# Patient Record
Sex: FEMALE | Race: BLACK OR AFRICAN AMERICAN | NOT HISPANIC OR LATINO | URBAN - METROPOLITAN AREA
[De-identification: names, ages, dates, MRNs, and addresses within clinical notes are randomized per-mention and may not be internally consistent; named-entity substitution may affect disease eponyms.]

---

## 2023-09-22 ENCOUNTER — HOSPITAL ENCOUNTER (INPATIENT)
Dept: DATA CONVERSION | Facility: HOSPITAL | Age: 88
LOS: 5 days | Discharge: HOME | End: 2023-09-27
Attending: INTERNAL MEDICINE | Admitting: INTERNAL MEDICINE
Payer: MEDICARE

## 2023-09-22 DIAGNOSIS — K92.2 GASTROINTESTINAL HEMORRHAGE, UNSPECIFIED: ICD-10-CM

## 2023-09-22 DIAGNOSIS — R10.84 GENERALIZED ABDOMINAL PAIN: ICD-10-CM

## 2023-09-22 LAB
ABO GROUP (TYPE) IN BLOOD: NORMAL
ACTIVATED PARTIAL THROMBOPLASTIN TIME IN PPP BY COAGULATION ASSAY: 40 SEC (ref 27–38)
ALANINE AMINOTRANSFERASE (SGPT) (U/L) IN SER/PLAS: 6 U/L (ref 7–45)
ALBUMIN (G/DL) IN SER/PLAS: 2.6 G/DL (ref 3.4–5)
ALBUMIN (G/DL) IN SER/PLAS: 2.6 G/DL (ref 3.4–5)
ALKALINE PHOSPHATASE (U/L) IN SER/PLAS: 56 U/L (ref 33–136)
ANION GAP IN SER/PLAS: 12 MMOL/L (ref 10–20)
ANTIBODY SCREEN: NORMAL
ASPARTATE AMINOTRANSFERASE (SGOT) (U/L) IN SER/PLAS: 20 U/L (ref 9–39)
BASOPHILS (10*3/UL) IN BLOOD BY AUTOMATED COUNT: 0.04 X10E9/L (ref 0–0.1)
BASOPHILS/100 LEUKOCYTES IN BLOOD BY AUTOMATED COUNT: 0.7 % (ref 0–2)
BILIRUBIN DIRECT (MG/DL) IN SER/PLAS: 0.1 MG/DL (ref 0–0.3)
BILIRUBIN TOTAL (MG/DL) IN SER/PLAS: 0.5 MG/DL (ref 0–1.2)
CALCIUM (MG/DL) IN SER/PLAS: 8.2 MG/DL (ref 8.6–10.3)
CARBON DIOXIDE, TOTAL (MMOL/L) IN SER/PLAS: 24 MMOL/L (ref 21–32)
CHLORIDE (MMOL/L) IN SER/PLAS: 106 MMOL/L (ref 98–107)
CREATININE (MG/DL) IN SER/PLAS: 1.29 MG/DL (ref 0.5–1.05)
EOSINOPHILS (10*3/UL) IN BLOOD BY AUTOMATED COUNT: 0.03 X10E9/L (ref 0–0.4)
EOSINOPHILS/100 LEUKOCYTES IN BLOOD BY AUTOMATED COUNT: 0.5 % (ref 0–6)
ERYTHROCYTE DISTRIBUTION WIDTH (RATIO) BY AUTOMATED COUNT: 17.2 % (ref 11.5–14.5)
ERYTHROCYTE MEAN CORPUSCULAR HEMOGLOBIN CONCENTRATION (G/DL) BY AUTOMATED: 30 G/DL (ref 32–36)
ERYTHROCYTE MEAN CORPUSCULAR VOLUME (FL) BY AUTOMATED COUNT: 92 FL (ref 80–100)
ERYTHROCYTES (10*6/UL) IN BLOOD BY AUTOMATED COUNT: 2.76 X10E12/L (ref 4–5.2)
GFR FEMALE: 40 ML/MIN/1.73M2
GLUCOSE (MG/DL) IN SER/PLAS: 93 MG/DL (ref 74–99)
HEMATOCRIT (%) IN BLOOD BY AUTOMATED COUNT: 25.3 % (ref 36–46)
HEMOGLOBIN (G/DL) IN BLOOD: 7.6 G/DL (ref 12–16)
IMMATURE GRANULOCYTES/100 LEUKOCYTES IN BLOOD BY AUTOMATED COUNT: 0.5 % (ref 0–0.9)
INR IN PPP BY COAGULATION ASSAY: 1.1 (ref 0.9–1.1)
LACTATE (MMOL/L) IN SER/PLAS: 1.3 MMOL/L (ref 0.4–2)
LACTATE (MMOL/L) IN SER/PLAS: 2.6 MMOL/L (ref 0.4–2)
LEUKOCYTES (10*3/UL) IN BLOOD BY AUTOMATED COUNT: 5.9 X10E9/L (ref 4.4–11.3)
LYMPHOCYTES (10*3/UL) IN BLOOD BY AUTOMATED COUNT: 0.74 X10E9/L (ref 0.8–3)
LYMPHOCYTES/100 LEUKOCYTES IN BLOOD BY AUTOMATED COUNT: 12.5 % (ref 13–44)
MONOCYTES (10*3/UL) IN BLOOD BY AUTOMATED COUNT: 0.56 X10E9/L (ref 0.05–0.8)
MONOCYTES/100 LEUKOCYTES IN BLOOD BY AUTOMATED COUNT: 9.4 % (ref 2–10)
NEUTROPHILS (10*3/UL) IN BLOOD BY AUTOMATED COUNT: 4.54 X10E9/L (ref 1.6–5.5)
NEUTROPHILS/100 LEUKOCYTES IN BLOOD BY AUTOMATED COUNT: 76.4 % (ref 40–80)
PHOSPHATE (MG/DL) IN SER/PLAS: 2.1 MG/DL (ref 2.5–4.9)
PLATELETS (10*3/UL) IN BLOOD AUTOMATED COUNT: 277 X10E9/L (ref 150–450)
POTASSIUM (MMOL/L) IN SER/PLAS: 3.6 MMOL/L (ref 3.5–5.3)
PROTEIN TOTAL: 6 G/DL (ref 6.4–8.2)
PROTHROMBIN TIME (PT) IN PPP BY COAGULATION ASSAY: 12.9 SEC (ref 9.8–12.8)
RH FACTOR: NORMAL
SODIUM (MMOL/L) IN SER/PLAS: 138 MMOL/L (ref 136–145)
UREA NITROGEN (MG/DL) IN SER/PLAS: 10 MG/DL (ref 6–23)

## 2023-09-23 LAB
ABO GROUP (TYPE) IN BLOOD: NORMAL
ANION GAP IN SER/PLAS: 14 MMOL/L (ref 10–20)
ANION GAP IN SER/PLAS: NORMAL
CALCIUM (MG/DL) IN SER/PLAS: 7.8 MG/DL (ref 8.6–10.3)
CALCIUM (MG/DL) IN SER/PLAS: NORMAL
CARBON DIOXIDE, TOTAL (MMOL/L) IN SER/PLAS: 21 MMOL/L (ref 21–32)
CARBON DIOXIDE, TOTAL (MMOL/L) IN SER/PLAS: NORMAL
CHLORIDE (MMOL/L) IN SER/PLAS: 108 MMOL/L (ref 98–107)
CHLORIDE (MMOL/L) IN SER/PLAS: NORMAL
CREATININE (MG/DL) IN SER/PLAS: 1.04 MG/DL (ref 0.5–1.05)
CREATININE (MG/DL) IN SER/PLAS: NORMAL
ERYTHROCYTE DISTRIBUTION WIDTH (RATIO) BY AUTOMATED COUNT: 15.9 % (ref 11.5–14.5)
ERYTHROCYTE MEAN CORPUSCULAR HEMOGLOBIN CONCENTRATION (G/DL) BY AUTOMATED: 31.1 G/DL (ref 32–36)
ERYTHROCYTE MEAN CORPUSCULAR VOLUME (FL) BY AUTOMATED COUNT: 91 FL (ref 80–100)
ERYTHROCYTES (10*6/UL) IN BLOOD BY AUTOMATED COUNT: 3.19 X10E12/L (ref 4–5.2)
GFR FEMALE: 52 ML/MIN/1.73M2
GFR FEMALE: NORMAL
GFR MALE: NORMAL
GLUCOSE (MG/DL) IN SER/PLAS: 78 MG/DL (ref 74–99)
GLUCOSE (MG/DL) IN SER/PLAS: NORMAL
HEMATOCRIT (%) IN BLOOD BY AUTOMATED COUNT: 22.3 % (ref 36–46)
HEMATOCRIT (%) IN BLOOD BY AUTOMATED COUNT: 28.9 % (ref 36–46)
HEMATOCRIT (%) IN BLOOD BY AUTOMATED COUNT: 30.3 % (ref 36–46)
HEMATOCRIT (%) IN BLOOD BY AUTOMATED COUNT: 32.2 % (ref 36–46)
HEMATOCRIT (%) IN BLOOD BY AUTOMATED COUNT: NORMAL
HEMOGLOBIN (G/DL) IN BLOOD: 10 G/DL (ref 12–16)
HEMOGLOBIN (G/DL) IN BLOOD: 6.8 G/DL (ref 12–16)
HEMOGLOBIN (G/DL) IN BLOOD: 9 G/DL (ref 12–16)
HEMOGLOBIN (G/DL) IN BLOOD: 9.3 G/DL (ref 12–16)
HEMOGLOBIN (G/DL) IN BLOOD: NORMAL
LEUKOCYTES (10*3/UL) IN BLOOD BY AUTOMATED COUNT: 6.4 X10E9/L (ref 4.4–11.3)
PLATELETS (10*3/UL) IN BLOOD AUTOMATED COUNT: 241 X10E9/L (ref 150–450)
POTASSIUM (MMOL/L) IN SER/PLAS: 3.6 MMOL/L (ref 3.5–5.3)
POTASSIUM (MMOL/L) IN SER/PLAS: NORMAL
RH FACTOR: NORMAL
SODIUM (MMOL/L) IN SER/PLAS: 139 MMOL/L (ref 136–145)
SODIUM (MMOL/L) IN SER/PLAS: NORMAL
UREA NITROGEN (MG/DL) IN SER/PLAS: 10 MG/DL (ref 6–23)
UREA NITROGEN (MG/DL) IN SER/PLAS: NORMAL

## 2023-09-24 LAB
APPEARANCE, URINE: ABNORMAL
BILIRUBIN, URINE: NEGATIVE
BLOOD, URINE: ABNORMAL
COLOR, URINE: ABNORMAL
GLUCOSE, URINE: NEGATIVE MG/DL
HEMATOCRIT (%) IN BLOOD BY AUTOMATED COUNT: 23.8 % (ref 36–46)
HEMATOCRIT (%) IN BLOOD BY AUTOMATED COUNT: 24.2 % (ref 36–46)
HEMOGLOBIN (G/DL) IN BLOOD: 7.5 G/DL (ref 12–16)
HEMOGLOBIN (G/DL) IN BLOOD: 7.6 G/DL (ref 12–16)
KETONES, URINE: NEGATIVE MG/DL
LEUKOCYTE ESTERASE, URINE: ABNORMAL
MUCUS, URINE: ABNORMAL /LPF
NITRITE, URINE: NEGATIVE
PH, URINE: 5 (ref 5–8)
PROTEIN, URINE: NEGATIVE MG/DL
RBC, URINE: 6 /HPF (ref 0–5)
SARS-COV-2 RESULT: DETECTED
SPECIFIC GRAVITY, URINE: 1.01 (ref 1–1.03)
SQUAMOUS EPITHELIAL CELLS, URINE: 1 /HPF
TRANSITIONAL EPITHELIAL CELLS, URINE: <1 /HPF
UROBILINOGEN, URINE: <2 MG/DL (ref 0–1.9)
WBC, URINE: >182 /HPF (ref 0–5)

## 2023-09-25 LAB
ANION GAP IN SER/PLAS: 12 MMOL/L (ref 10–20)
CALCIUM (MG/DL) IN SER/PLAS: 7.3 MG/DL (ref 8.6–10.3)
CARBON DIOXIDE, TOTAL (MMOL/L) IN SER/PLAS: 21 MMOL/L (ref 21–32)
CHLORIDE (MMOL/L) IN SER/PLAS: 109 MMOL/L (ref 98–107)
CREATININE (MG/DL) IN SER/PLAS: 0.98 MG/DL (ref 0.5–1.05)
ERYTHROCYTE DISTRIBUTION WIDTH (RATIO) BY AUTOMATED COUNT: 16.7 % (ref 11.5–14.5)
ERYTHROCYTE MEAN CORPUSCULAR HEMOGLOBIN CONCENTRATION (G/DL) BY AUTOMATED: 30.2 G/DL (ref 32–36)
ERYTHROCYTE MEAN CORPUSCULAR VOLUME (FL) BY AUTOMATED COUNT: 94 FL (ref 80–100)
ERYTHROCYTES (10*6/UL) IN BLOOD BY AUTOMATED COUNT: 2.85 X10E12/L (ref 4–5.2)
GFR FEMALE: 56 ML/MIN/1.73M2
GLUCOSE (MG/DL) IN SER/PLAS: 91 MG/DL (ref 74–99)
HEMATOCRIT (%) IN BLOOD BY AUTOMATED COUNT: 26.8 % (ref 36–46)
HEMOGLOBIN (G/DL) IN BLOOD: 8.1 G/DL (ref 12–16)
LEUKOCYTES (10*3/UL) IN BLOOD BY AUTOMATED COUNT: 5.8 X10E9/L (ref 4.4–11.3)
PLATELETS (10*3/UL) IN BLOOD AUTOMATED COUNT: 262 X10E9/L (ref 150–450)
POTASSIUM (MMOL/L) IN SER/PLAS: 3.4 MMOL/L (ref 3.5–5.3)
SODIUM (MMOL/L) IN SER/PLAS: 139 MMOL/L (ref 136–145)
UREA NITROGEN (MG/DL) IN SER/PLAS: 9 MG/DL (ref 6–23)
URINE CULTURE: NO GROWTH

## 2023-09-26 LAB
ABO GROUP (TYPE) IN BLOOD: NORMAL
ANION GAP IN SER/PLAS: 14 MMOL/L (ref 10–20)
ANTIBODY SCREEN: NORMAL
CALCIUM (MG/DL) IN SER/PLAS: 7.3 MG/DL (ref 8.6–10.3)
CARBON DIOXIDE, TOTAL (MMOL/L) IN SER/PLAS: 19 MMOL/L (ref 21–32)
CHLORIDE (MMOL/L) IN SER/PLAS: 110 MMOL/L (ref 98–107)
CREATININE (MG/DL) IN SER/PLAS: 1.11 MG/DL (ref 0.5–1.05)
ERYTHROCYTE DISTRIBUTION WIDTH (RATIO) BY AUTOMATED COUNT: 16.8 % (ref 11.5–14.5)
ERYTHROCYTE MEAN CORPUSCULAR HEMOGLOBIN CONCENTRATION (G/DL) BY AUTOMATED: 29.8 G/DL (ref 32–36)
ERYTHROCYTE MEAN CORPUSCULAR VOLUME (FL) BY AUTOMATED COUNT: 94 FL (ref 80–100)
ERYTHROCYTES (10*6/UL) IN BLOOD BY AUTOMATED COUNT: 2.53 X10E12/L (ref 4–5.2)
FERRITIN (UG/LL) IN SER/PLAS: 886 UG/L (ref 8–150)
GFR FEMALE: 48 ML/MIN/1.73M2
GLUCOSE (MG/DL) IN SER/PLAS: 90 MG/DL (ref 74–99)
HEMATOCRIT (%) IN BLOOD BY AUTOMATED COUNT: 23.8 % (ref 36–46)
HEMOGLOBIN (G/DL) IN BLOOD: 7.1 G/DL (ref 12–16)
IRON (UG/DL) IN SER/PLAS: 36 UG/DL (ref 35–150)
IRON BINDING CAPACITY (UG/DL) IN SER/PLAS: 121 UG/DL (ref 240–445)
IRON SATURATION (%) IN SER/PLAS: 30 % (ref 25–45)
LEUKOCYTES (10*3/UL) IN BLOOD BY AUTOMATED COUNT: 6 X10E9/L (ref 4.4–11.3)
MAGNESIUM (MG/DL) IN SER/PLAS: 1.9 MG/DL (ref 1.6–2.4)
PLATELETS (10*3/UL) IN BLOOD AUTOMATED COUNT: 241 X10E9/L (ref 150–450)
POTASSIUM (MMOL/L) IN SER/PLAS: 3.4 MMOL/L (ref 3.5–5.3)
RH FACTOR: NORMAL
SODIUM (MMOL/L) IN SER/PLAS: 140 MMOL/L (ref 136–145)
UREA NITROGEN (MG/DL) IN SER/PLAS: 8 MG/DL (ref 6–23)

## 2023-09-27 LAB
ERYTHROCYTE DISTRIBUTION WIDTH (RATIO) BY AUTOMATED COUNT: 17.1 % (ref 11.5–14.5)
ERYTHROCYTE MEAN CORPUSCULAR HEMOGLOBIN CONCENTRATION (G/DL) BY AUTOMATED: 31.9 G/DL (ref 32–36)
ERYTHROCYTE MEAN CORPUSCULAR VOLUME (FL) BY AUTOMATED COUNT: 89 FL (ref 80–100)
ERYTHROCYTES (10*6/UL) IN BLOOD BY AUTOMATED COUNT: 3.51 X10E12/L (ref 4–5.2)
HEMATOCRIT (%) IN BLOOD BY AUTOMATED COUNT: 31.3 % (ref 36–46)
HEMOGLOBIN (G/DL) IN BLOOD: 10 G/DL (ref 12–16)
LEUKOCYTES (10*3/UL) IN BLOOD BY AUTOMATED COUNT: 7.4 X10E9/L (ref 4.4–11.3)
PLATELETS (10*3/UL) IN BLOOD AUTOMATED COUNT: 299 X10E9/L (ref 150–450)

## 2023-09-30 NOTE — H&P
History of Present Illness:   HPI:    MARY TESFAYE is a 87 year old Female with a past medical history of paroxysmal atrial fibrillation not on OAC due to a GI bleed on Xarelto, dementia, stroke  in 2021, anemia, CKD 3, hypertension, hyperlipidemia, cataracts and seizure disorder with focus of for seizures due to right frontal encephalomalacia she is on lifelong Keppra who recently admitted to [facility] with bright red blood per rectum and  now presents to [facility] complaining of bright red blood per rectum.  She endorses that at home today she had numerous episodes of bright red blood per rectum.  Her home health care worker noted that the patient had a large amount of blood  clots in her stool today as well.  Due to the persistence of her GI bleeding she was brought back to the emergency department here at [facility].  She denies any shortness of breath chest pain lightheadedness abdominal pain nausea vomiting or  diarrhea.    Past medical history  Paroxysmal atrial fibrillation not currently on anticoagulation due to GI bleed on Xarelto  Dementia  Stroke in November 2021  Anemia  CKD 3  Hypertension  Hyperlipidemia  Cataracts  Seizure disorder  R frontal encephalomalacia, on Keppra  Recently hospitalized at [facility] with    Past surgical history  Hernia repair  Hysterectomy  Common bile duct stent  Cholecystectomy    Social history  Lives in a senior living facility  No current use of alcohol or tobacco    Comorbidities:   Comorbidites:  ·  Comorbid Conditions atrial fibrillation, chronic kidney disease, hypertension   ·  Type of Atrial Fibrillation Paroxysmal   ·  Chronic Kidney Disease hypertension   ·  CKD Stage Stage 3     Family History:   Family History: reviewed and not pertinent to presenting  problem     Social History:   Social History:  Smoking Status never smoker (1)   Alcohol Use denies   Drug Use denies              Allergies:  ·  No Known Allergies :     Medications  Prior to Admission:      Orders Reconciliation is incomplete.    aspirin 81 mg oral delayed release tablet: 1 tab(s) orally once a day  lisinopril 40 mg oral tablet: 1 tab(s) orally once a day  levETIRAcetam 500 mg oral tablet: 1 tab(s) orally 2 times a day -.Meds to Beds   Anticipated day of discharge 6/28  amLODIPine 10 mg oral tablet: 1 tab(s) orally once a day  furosemide 20 mg oral tablet: 1 tab(s) orally once a day  atorvastatin 40 mg oral tablet: 1 tab(s) orally once a day -.ADOD 7/5 11am - .Meds to [facility]  cephalexin: CARE GIVER STATED THIS WAS AN NEW MEDICATION.    Review of Systems:   Incomplete ROS: patient's impaired mental status     Constitutional: POSITIVE: Malaise; NEGATIVE: Fever,  Chills, Anorexia, Weight Loss     Eyes: NEGATIVE: Blurry Vision, Drainage, Diploplia,  Redness, Vision Loss/ Change     ENMT: NEGATIVE: Nasal Discharge, Nasal Congestion,  Ear Pain, Mouth Pain, Throat Pain     Respiratory: NEGATIVE: Dry Cough, Productive Cough,  Hemoptysis, Wheezing, Shortness of Breath     Cardiac: NEGATIVE: Chest Pain, Dyspnea on Exertion,  Orthopnea, Palpitations, Syncope     Gastrointestinal: NEGATIVE: Nausea, Vomiting, Diarrhea,  Constipation, Abdominal Pain; COMMENTS: Bright red blood per rectum     Genitourinary: NEGATIVE: Discharge, Dysuria, Flank  Pain, Frequency, Hematuria     Musculoskeletal: NEGATIVE: Decreased ROM, Pain, Swelling,  Stiffness, Weakness     Neurological: NEGATIVE: Dizziness, Confusion, Headache,  Seizures, Syncope     Psychiatric: NEGATIVE: Mood Changes, Anxiety, Hallucinations,  Sleep Changes, Suicidal Ideas     Skin: NEGATIVE: Mass, Pain, Pruritus, Rash, Ulcer     Endocrine: NEGATIVE: Heat Intolerance, Cold Intolerance,  Sweat, Polyuria, Thirst     Hematologic/Lymph: POSITIVE: Anemia; NEGATIVE: Bruising,  Easy Bleeding, Night Sweats, Petechiae     Allergic/Immunologic: NEGATIVE: Anaphylaxis, Itchy/  Teary Eyes, Itching, Sneezing, Swelling     Breast: NEGATIVE: Pain,  Mass, Discharge, Nipple Itching,  Gynecomastia     All Other Systems: All other systems reviewed and  are negative     Objective:     Objective Information:      T   P  R  BP   MAP  SpO2   Value  36.6  59  18  160/73   76  98%  Date/Time 9/22 21:29 9/22 21:29 9/22 21:29 9/22 21:29  9/22 20:00 9/22 21:29  Range  (36.6C - 36.7C )  (59 - 85 )  (12 - 19 )  (102 - 160 )/ (38 - 75 )  (59 - 76 )  (95% - 100% )      Pain reported at 9/22 21:19: 0 = None    Physical Exam by System:    Constitutional: Awake and alert. Non toxic.  Pleasantly  confused   Eyes: PERRL, EOMI, clear sclera   ENMT: mucous membranes moist, no apparent injury,  no lesions seen   Head/Neck: Neck supple, no apparent injury, thyroid  without mass or tenderness, No JVD, trachea midline, no bruits   Respiratory/Thorax: Lungs are Clear to auscultation   Cardiovascular: Regular, rate and rhythm,  2+ equal  pulses of the extremities, normal S 1and S 2   Gastrointestinal: Nondistended, soft, non-tender,  no rebound tenderness or guarding, no masses palpable, no organomegaly, +BS, no bruits   Genitourinary: No Discharge, vesicles or other abnormalities   Musculoskeletal: Moves all 4 extremities.   Extremities: No edema. No calf tenderness.   Neurological: Awake and alert. No focal motor deficits.   Pleasantly confused   Breast: No masses, tenderness, no discharge or discoloration   Lymphatic: No significant lymphadenopathy   Psychological: No evidence of tension   Skin: Warm and dry     Medications:    Medications:      CARDIOVASCULAR AGENTS:    1. Lisinopril:  40  mg  Oral  Daily    2. amLODIPine (NORVASC):  10  mg  Oral  Daily    3. Furosemide:  20  mg  Oral  Daily      CENTRAL NERVOUS SYSTEM AGENTS:    1. Acetaminophen:  650  mg  Oral  Every 4 Hours   PRN       2. Aspirin Enteric Coated:  81  mg  Oral  Daily    3. levETIRAcetam (KEPPRA):  500  mg  Oral  2 Times a Day    4. Ondansetron Injectable:  4  mg  IntraVenous Push  Every 4 Hours   PRN          GASTROINTESTINAL AGENTS:    1. Pantoprazole Injectable:  40  mg  IntraVenous Push  Every 12 Hours      METABOLIC AGENTS:    1. Atorvastatin:  40  mg  Oral  Daily      NUTRITIONAL PRODUCTS:    1. Sodium Chloride 0.9% Infusion:  1000  mL  IntraVenous  <Continuous>      Recent Lab Results:    Results:    CBC: 9/22/2023 15:13              \     Hgb     /                              \     7.6 L    /  WBC  ----------------  Plt               5.9       ----------------    277              /     Hct     \                              /     25.3 L    \            RBC: 2.76 L    MCV: 92     Neutrophil %: 76.4      RFP: 9/22/2023 15:13  NA+        Cl-     BUN  /                         138    106    10  /  --------------------------------  Glucose                ---------------------------  93    K+     HCO3-   Creat \                         3.6    24    1.29 H \  Calcium : 8.2 LAnion Gap : 12          Albumin : 2.6 L     Phos : 2.1 L      Coagulation: 9/22/2023 15:13  PT  /                    12.9 H /  -------<    INR          ----------<      1.1  PTT\                    40 H \                         I have reviewed these laboratory results:    Lactate, Level  Trending View      Result 22-Sep-2023 18:44:00  22-Sep-2023 15:13:00    Lactate, Level 1.3   2.6   H        Hepatic Function Panel  22-Sep-2023 15:13:00      Result Value    Aspartate Transaminase, Serum  20    ALB  2.6   L   T Bili  0.5    Bilirubin, Serum Direct - Conjugated  0.1    ALKP  56    Alanine Aminotransferase, Serum  6   L   T Pro  6.0   L     Complete Blood Count + Differential  22-Sep-2023 15:13:00      Result Value    White Blood Cell Count  5.9    Red Blood Cell Count  2.76   L   HGB  7.6   L   HCT  25.3   L   MCV  92    MCHC  30.0   L   PLT  277    RDW-CV  17.2   H   Neutrophil %  76.4    Immature Granulocytes %  0.5    Lymphocyte %  12.5    Monocyte %  9.4    Eosinophil %  0.5    Basophil %  0.7    Neutrophil Count  4.54    Lymphocyte  Count  0.74   L   Monocyte Count  0.56    Eosinophil Count  0.03    Basophil Count  0.04      Renal Function Panel  22-Sep-2023 15:13:00      Result Value    Glucose, Serum  93    NA  138    K  3.6    CL  106    Bicarbonate, Serum  24    Anion Gap, Serum  12    BUN  10    CREAT  1.29   H   GFR Female  40   A   Calcium, Serum  8.2   L   Phosphorus, Serum  2.1   L   ALB  2.6   L     Coagulation Screen  22-Sep-2023 15:13:00      Result Value    Prothrombin Time, Plasma  12.9   H   International Normalized Ratio, Plasma  1.1    Activated Partial Thromboplastin Time  40   H       Assessment and Plan:     Impression 1: Lower GI bleed   Plan for Impression 1: Serial hemoglobin hematocrit.   Transfuse to keep hemoglobin greater than 7.0  Protonix 40 mg IV every 12 hours  IV hydration  GI consultation   Impression 2: Paroxysmal atrial fibrillation   Plan for Impression 2: Telemetry   not currently on AOC due to GI bleeding w Xarelto   Impression 3: Hypertension   Plan for Impression 3: Lisinopril 40 mg orally daily  Amlodipine 10 mg orally daily  Furosemide 20 mg orally daily   Impression 4: Seizure disorder focus is R encephalomalacia   Plan for Impression 4: Keppra 500 mg every 12 hours   Impression 5: Hyperlipidemia   Plan for Impression 5: Atorvastatin 40 mg orally  daily   Impression 6: CKD 3   Plan for Impression 6: Trend creatinine   Impression 7: History of a stroke 2021   Plan for Impression 7: On aspirin 81 mg orally daily  which is on hold due to GI bleeding  Atorvastatin 40 mg orally daily   Impression 8: DVT prophylaxis   Plan for Impression 8: No heparin or Lovenox due  to GI bleeding  SCDs       Electronic Signatures:  Jerry Simmons ()  (Signed 22-Sep-2023 22:19)   Authored: History of Present Illness, Comorbidities,  Family History, Social History, Allergies, Medications Prior to Admission, Review of Systems, Objective, Assessment and Plan, Note Completion      Last Updated: 22-Sep-2023 22:19 by Iris  "Jerry (DO)    References:  1.  Data Referenced From \"Patient Profile - Adult v2\" 22-Sep-2023 21:26   "

## 2023-09-30 NOTE — PROGRESS NOTES
Service: Medicine     Subjective Data:   MARY TESFAYE is a 87 year old Female who is Hospital Day # 5.     She had some more blood in stool today.    Objective Data:     Objective Information:      T   P  R  BP   MAP  SpO2   Value  36.6  61  17  152/86      100%  Date/Time 9/26 16:03 9/26 16:03 9/26 16:03 9/26 16:03    9/26 16:03  Range  (36.1C - 36.8C )  (51 - 75 )  (16 - 18 )  (107 - 164 )/ (49 - 95 )    (95% - 100% )      Pain reported at 9/26 4:00: 0 = None    Physical Exam by System:    Constitutional: no acute distress   Respiratory/Thorax: CTAB   Cardiovascular: regular rhythm, no murmurs   Gastrointestinal: normoactive BS, nontender, non-distended   Extremities: no edema in legs   Neurological: alert and oriented, moving all extremities   Psychological: normal affect   Skin: no rashes or lesions     Recent Lab Results:    Results:    CBC: 9/26/2023 07:11              \     Hgb     /                              \     7.1 L    /  WBC  ----------------  Plt               6.0       ----------------    241              /     Hct     \                              /     23.8 L    \            RBC: 2.53 L    MCV: 94           BMP: 9/26/2023 07:11  NA+        Cl-     BUN  /                         140    110 H   8  /  --------------------------------  Glucose                ---------------------------  90    K+     HCO3-   Creat \                         3.4 L 19 L    1.11 H \  Calcium : 7.3 L    Anion Gap : 14      Assessment and Plan:   Code Status:  ·  Code Status Full Code     Assessment:    87 yoF with blood from rectum.    9/26: Will get second PRBC unit today. Had some BRBPR today.      Diverticular bleeding  Acute on chronic blood loss anemia  COVID infection  Hematuria(?)  Paroxysmal afib  Dementia  Seizure disorder  HTN  - transfusion today  - if no further bleeding and hgb is ok tomorrow, can dc      Electronic Signatures:  Paulino Farris)  (Signed 26-Sep-2023 17:24)   Authored:  Service, Subjective Data, Objective Data, Assessment  and Plan, Note Completion      Last Updated: 26-Sep-2023 17:24 by Paulino Farris)

## 2023-09-30 NOTE — DISCHARGE SUMMARY
Send Summary:   Discharge Summary Providers:  Provider Role Provider Name   · Attending Paulino Farris   · Consulting Vj Jorge   · Primary Required, No Pcp       Note Recipients: Required, No Pcp, MD       Discharge:    Summary:   Admission Date: .22-Sep-2023 12:50:00   Discharge Date: 27-Sep-2023   Attending Physician at Discharge: Paulino Farris   Admission Reason: anemia   Final Discharge Diagnoses: gi bleed   Procedures: none   Condition at Discharge: Satisfactory   Disposition at Discharge: .Home   Vital Signs:        T   P  R  BP   MAP  SpO2   Value  36.6  66  17  145/45      98%  Date/Time 9/27 7:59 9/27 7:59 9/27 7:59 9/27 7:59    9/27 7:59  Range  (36.4C - 37C )  (51 - 67 )  (16 - 18 )  (119 - 152 )/ (45 - 86 )    (98% - 100% )  Highest temp of 37 C was recorded at 9/27 0:44    Date:            Weight/Scale Type:  Height:   22-Sep-2023 21:26  65  kg / standing  159.9  cm  Physical Exam:    No distress  Alert and oriented  CTAB  RRR  Soft abdomen  No leg edema  Hospital Course:    87 yoF with blood from rectum.    Seen by GI; suspected to be diverticular bleed. She was given 1 u PRBC. No plan for scopes here. Bleeding seems to be slowing/stopping. Incidental COVID positive.     9/26: Will get second PRBC unit today. Had some BRBPR today.    9/27: No further bleeding seen. Hgb is good today. Discharge home.     Diverticular bleeding  Acute on chronic blood loss anemia  COVID infection  Hematuria, UTI  Paroxysmal afib  Dementia  Seizure disorder  HTN  CKD      I spent more than 30 min coordinating this patient's discharge.       Discharge Information:    and Continuing Care:   Lab Results - Pending:    None  Radiology Results - Pending: None   Discharge Instructions:    na  Discharge Medications: Home Medication   aspirin 81 mg oral delayed release tablet - 1 tab(s) orally once a day  lisinopril 40 mg oral tablet - 1 tab(s) orally once a day  levETIRAcetam 500 mg oral tablet - 1 tab(s) orally 2  times a day -.Meds to Beds   Anticipated day of discharge 6/28  amLODIPine 10 mg oral tablet - 1 tab(s) orally once a day  furosemide 20 mg oral tablet - 1 tab(s) orally once a day  atorvastatin 40 mg oral tablet - 1 tab(s) orally once a day -.ADOD 7/5 11am - .Meds to Beds [facility]  ferrous sulfate 325 mg (65 mg elemental iron) oral delayed release tablet - 1 tab(s) orally once a day     take with stool softener      PRN Medication     DNR Status:   ·  Code Status Code Status order at time of discharge: Full Code       Electronic Signatures:  Paulino Farris)  (Signed 27-Sep-2023 10:31)   Authored: Send Summary, Summary Content, Ongoing Care,  DNR Status, Note Completion      Last Updated: 27-Sep-2023 10:31 by Paulino Farris)

## 2023-09-30 NOTE — PROGRESS NOTES
Service: Medicine     Subjective Data:   MARY TESFAYE is a 87 year old Female who is Hospital Day # 2.     Repeat hemoglobin is 9, no further bloody stools, but she does have hematuria therefore consulted urology, aspirin currently on hold.  Currently n.p.o. awaiting GI input on IV fluids.    Objective Data:     Objective Information:      T   P  R  BP   MAP  SpO2   Value  36  70  17  150/69   76  100%  Date/Time 9/23 8:31 9/23 8:31 9/23 8:31 9/23 8:31  9/22 20:00 9/23 8:31  Range  (36C - 36.7C )  (59 - 85 )  (12 - 19 )  (102 - 160 )/ (38 - 75 )  (59 - 76 )  (95% - 100% )      Pain reported at 9/22 21:19: 0 = None    Physical Exam by System:    Constitutional: Well developed, awake/alert/oriented  x3, no distress, alert and cooperative   ENMT: mucous membranes moist, no apparent injury,  no lesions seen   Head/Neck: Neck supple, no apparent injury, thyroid  without mass or tenderness, No JVD, trachea midline, no bruits   Respiratory/Thorax: Patent airways, CTAB, normal  breath sounds with good chest expansion, thorax symmetric   Cardiovascular: Regular, rate and rhythm, no murmurs,  2+ equal pulses of the extremities, normal S 1and S 2   Gastrointestinal: Nondistended, soft, non-tender,  no rebound tenderness or guarding, no masses palpable, no organomegaly, +BS, no bruits   Extremities: normal extremities, no cyanosis edema,  contusions or wounds, no clubbing   Neurological: pleasantly confused     Medication:    Medications:          Continuous Medications       --------------------------------    1. Sodium Chloride 0.9% Infusion:  1000  mL  IntraVenous  <Continuous>         Scheduled Medications       --------------------------------    1. amLODIPine (NORVASC):  10  mg  Oral  Daily    2. Atorvastatin:  40  mg  Oral  Daily    3. Furosemide:  20  mg  Oral  Daily    4. levETIRAcetam (KEPPRA):  500  mg  Oral  2 Times a Day    5. Lisinopril:  40  mg  Oral  Daily    6. Pantoprazole Injectable:  40  mg   IntraVenous Push  Every 12 Hours         PRN Medications       --------------------------------    1. Acetaminophen:  650  mg  Oral  Every 4 Hours    2. Ondansetron Injectable:  4  mg  IntraVenous Push  Every 4 Hours           Currently Suspended Medications       --------------------------------    1. Aspirin Enteric Coated:  81  mg  Oral  Daily      Recent Lab Results:    Results:    CBC: 9/23/2023 09:59              \     Hgb     /                              \     9.0 L    /  WBC  ----------------  Plt               6.4       ----------------    241              /     Hct     \                              /     28.9 L    \            RBC: 3.19 L    MCV: 91     Neutrophil %: 76.4      RFP: 9/22/2023 15:13  NA+        Cl-     BUN  /                         138    106    10  /  --------------------------------  Glucose                ---------------------------  93    K+     HCO3-   Creat \                         3.6    24    1.29 H \  Calcium : 8.2 LAnion Gap : 12          Albumin : 2.6 L     Phos : 2.1 L      Coagulation: 9/22/2023 15:13  PT  /                    12.9 H /  -------<    INR          ----------<      1.1  PTT\                    40 H \                       Radiology Results:    Results:        Impression:    1. No evidence of active extravasation.  2. Cholelithiasis without evidence of cholecystitis.  3. Nonspecific subcutaneous lesion at the umbilicus which may be post  surgical/post procedural in nature versus pathological. Recommend  correlation with patient history.  4. Additional chronic findings as noted above.      CTA Chest Abdomen + Pelvis [Sep 22 2023  7:49PM]      Assessment and Plan:   Comorbidities:  ·  Comorbidity anemia   ·  Anemia acute blood loss anemia     Code Status:  ·  Code Status Full Code     Advance Care Planning:  Advance Care Planning: I evaluated the patient  and determined the patient's capacity to understand the risks, benefits and alternatives to treatment.  I elicited the patient's goals for treatment and reviewed advance directives and medical orders for life sustaining treatment. The patient was given  an opportunity to review a blank advance directive as appropriate.     Assessment:    1.  hematachezia  -no Further episodes this morning, aspirin on hold, awaiting GI consultation continue on IV fluids and n.p.o. status    2.  Acute blood loss anemia  -Hemoglobin was 6.8 that is post 1 unit of blood currently hemoglobin is 9    3.  Paroxysmal A-fib  -Not on anticoagulation due to history of GI bleed only aspirin which is also on hold    4.  hematuria  -consult urology    5. DEmentia  -at baseline      Electronic Signatures:  Stephanie Trotter)  (Signed 23-Sep-2023 11:37)   Authored: Service, Subjective Data, Objective Data, Assessment  and Plan, Note Completion      Last Updated: 23-Sep-2023 11:37 by Stephanie Trotter)

## 2023-09-30 NOTE — PROGRESS NOTES
Service: Gastroenterology     Subjective Data:   MARY TESFAYE is a 87 year old Female who is Hospital Day # 3.     Denies complaints, no bowel movements overnight .  Noted hematuria for which urology consulted.    Objective Data:     Objective Information:      T   P  R  BP   MAP  SpO2   Value  36.6  73  16  146/53      96%  Date/Time 9/24 8:06 9/24 8:06 9/24 8:06 9/24 8:06    9/24 8:06  Range  (36C - 37C )  (58 - 78 )  (16 - 18 )  (124 - 169 )/ (53 - 81 )    (96% - 100% )  Highest temp of 37 C was recorded at 9/23 23:51      Pain reported at 9/24 8:06: 0 = None    Physical Exam by System:    Constitutional: alert, nad   Gastrointestinal: soft,nt/nd     Medication:    Medications:          Continuous Medications       --------------------------------    1. Sodium Chloride 0.9% Infusion:  1000  mL  IntraVenous  <Continuous>         Scheduled Medications       --------------------------------    1. amLODIPine (NORVASC):  10  mg  Oral  Daily    2. Atorvastatin:  40  mg  Oral  Daily    3. Furosemide:  20  mg  Oral  Daily    4. levETIRAcetam (KEPPRA):  500  mg  Oral  2 Times a Day    5. Lisinopril:  40  mg  Oral  Daily    6. Pantoprazole Injectable:  40  mg  IntraVenous Push  Every 12 Hours         PRN Medications       --------------------------------    1. Acetaminophen:  650  mg  Oral  Every 4 Hours    2. Ondansetron Injectable:  4  mg  IntraVenous Push  Every 4 Hours           Currently Suspended Medications       --------------------------------    1. Aspirin Enteric Coated:  81  mg  Oral  Daily      Recent Lab Results:    Results:    CBC: 9/23/2023 12:30              \     Hgb     /                              \     Canceled       /  WBC  ----------------  Plt               6.4       ----------------    241              /     Hct     \                              /     Canceled       \            RBC: 3.19 L    MCV: 91           BMP: 9/23/2023 13:04  NA+        Cl-     BUN  /                          139    108 H   10  /  --------------------------------  Glucose                ---------------------------  78    K+     HCO3-   Creat \                         3.6  21    1.04  \  Calcium : 7.8 L    Anion Gap : 14      Radiology Results:    Results:        Impression:    1. No evidence of active extravasation.  2. Cholelithiasis without evidence of cholecystitis.  3. Nonspecific subcutaneous lesion at the umbilicus which may be post  surgical/post procedural in nature versus pathological. Recommend  correlation with patient history.  4. Additional chronic findings as noted above.      CTA Chest Abdomen + Pelvis [Sep 22 2023  7:49PM]      Impression:  CTA Chest Abdomen + Pelvis [Sep 22 2023  6:43PM]      Impression:  CTA Chest Abdomen + Pelvis [Sep 22 2023  3:29PM]      Assessment and Plan:   Code Status:  ·  Code Status Full Code     Assessment:    88yo with AF not on AC due to GIB, dementia, h/o CVA 2021, CKD, HTN presenting with hematochezia.  Pt was just recently d/c'd from [facility] 9/21  for rectal bleeding  that was found to be diverticular s/p colonoscopy with injection of epi and clip placement on 9/19 . EGD negative .  No further rectal bleeding since admission.   Noted hematuria , Hgb down to 7.5 today .     Diverticular bleed, no bowel movements, suspecting old blood has been passed.  If develops recurrent clinically significant GIB will need IR intervention given recent endoscopic therapy     Monitor for recurrent rectal bleeding  supportive care per primary  urology has been consulted for hematuria  ok to advance to full liquid diet and further as tolerated    GI Will sign off, Pls reconsult if evidence of rectal bleeding .        Consult Status:  Consult Status    (select all that apply): initial  consult complete, will not follow, call as needed       Electronic Signatures:  Gloria Belle)  (Signed 24-Sep-2023 11:51)   Authored: Service, Subjective Data, Objective Data, Assessment  and Plan,  Note Completion      Last Updated: 24-Sep-2023 11:51 by Gloria Belle)

## 2023-09-30 NOTE — PROGRESS NOTES
Subjective Data:   MARY TESFAYE is a 87 year old Female who is Hospital Day # 3.    Overnight Events: Patient had an uneventful night.     Objective Data:     Objective Information:      T   P  R  BP   MAP  SpO2   Value  36.6  73  16  146/53      96%  Date/Time 9/24 8:06 9/24 8:06 9/24 8:06 9/24 8:06    9/24 8:06  Range  (36C - 37C )  (58 - 78 )  (16 - 18 )  (124 - 169 )/ (53 - 81 )    (96% - 100% )  Highest temp of 37 C was recorded at 9/23 23:51      Pain reported at 9/24 8:06: 0 = None    Physical Exam by System:    Constitutional: Well developed, awake/alert/oriented  x3, no distress, alert and cooperative   ENMT: mucous membranes moist, no apparent injury,  no lesions seen   Head/Neck: Neck supple, no apparent injury, thyroid  without mass or tenderness, No JVD, trachea midline, no bruits   Respiratory/Thorax: Patent airways, CTAB, normal  breath sounds with good chest expansion, thorax symmetric   Cardiovascular: Regular, rate and rhythm, no murmurs,  2+ equal pulses of the extremities, normal S 1and S 2   Gastrointestinal: Nondistended, soft, non-tender,  no rebound tenderness or guarding, no masses palpable, no organomegaly, +BS, no bruits   Extremities: normal extremities, no cyanosis edema,  contusions or wounds, no clubbing   Neurological: pleasantly confused     Recent Lab Results:    Results:    CBC: 9/23/2023 12:30              \     Hgb     /                              \     Canceled       /  WBC  ----------------  Plt                                     /     Hct     \                              /     Canceled       \                            BMP: 9/23/2023 13:04  NA+        Cl-     BUN  /                         139    108 H   10  /  --------------------------------  Glucose                ---------------------------  78    K+     HCO3-   Creat \                         3.6  21    1.04  \  Calcium : 7.8 L    Anion Gap : 14      Radiology Results:    Results:         Impression:    1. No evidence of active extravasation.  2. Cholelithiasis without evidence of cholecystitis.  3. Nonspecific subcutaneous lesion at the umbilicus which may be post  surgical/post procedural in nature versus pathological. Recommend  correlation with patient history.  4. Additional chronic findings as noted above.      CTA Chest Abdomen + Pelvis [Sep 22 2023  7:49PM]      Assessment and Plan:   Code Status:  ·  Code Status Full Code     Assessment:    Likely Diverticular Bleed:   History of GIB:   -Hemoglobin remains stable  - GI following, appreciate assistance. Recommend monitoring for recurrent rectal bleeding with supportive care.   - Diet advanced  - Will continue to monitor hemoglobin    Acute blood loss anemia:   Hematuria:   Anemia possibly secondary to a combination of diverticular bleed which has resolved, and possible hematuria.   - Hemoglobin on arrival 10, now down to 7.5 s/p 1 unit of PRBC  - Urology consulted, recommend straight cath for a UA with culture to rule out/in UTI. If no UTI, then recommend possible cystoscopy.  - Will continue to trend hemoglobin, transfuse for less than 7.   - Antibiotic consideration pending UA and culture results    Paroxysmal A-fib:   Per patient history  -Not on anticoagulation due to history of GI bleed only aspirin which is also on hold    Dementia:   Seizure Disorder:   Essential Hypertension:   Per patient history  - Continue outpatient follow-up.   - Continue home medications as able  - Holding ASA due to concern for active bleeding    DVT ppx: SCDs and ambulation as able  Dispostion: Unknown  LOS: >48 hours       Electronic Signatures:  Mynor Rowland)  (Signed 24-Sep-2023 14:05)   Authored: Subjective Data, Objective Data, Assessment  and Plan      Last Updated: 24-Sep-2023 14:05 by Mynor Rowland)

## 2024-03-06 NOTE — CONSULTS
Service:   Service: Gastroenterology     Consult:  Consult requested by (Attending Name): Jerry Simmons   Reason: Lower GI Bleed     History of Present Illness:   HPI:    88yo with AF not on AC due to GIB, dementia, h/o CVA 2021, CKD, HTN presenting with hematochezia.  Pt with dementia, limited historian, primarily from chart.  Pt  was just recently d/c'd from [facility] for rectal bleeding that was found to be diverticular s/p colonoscopy with injection of epi and clip placement . She was discharged on 9/21 , reportedly developed hematochezia with clots prompting presentation. Pt currently  denies any complaints, no abd pain, nv, bloating or other complaints. No bowel movements since admission, noted to have hematuria. CT angio on admission shwed no active bleeing, cholelithiasis.  [facility]- hiatal hernia, mild duodenitis, colonoscopy  9/19 with active bleeding diverticulum in sigmoid colon s/p epi and clips placement. On discharge Hgb 8.6.  On admission here Hgb 7.6 , downtrend to 6.8, s/p 1 unit overnight with Hgb now 10. INR wnl, Cr 1.29.      PMH  as above    Fhx: noncontributory    Soc hx: no tob/etoh/illicits    Review Family/Social History and ROS:   Social History:    Smoking Status: never smoker  (1)   Alcohol Use: denies (1)   Drug Use: denies  (1)     All Other Systems: All other systems reviewed and  are negative            Allergies:  ·  No Known Allergies :     Objective:     Objective Information:        T   P  R  BP   MAP  SpO2   Value  36.6  62  17  140/77   76  97%  Date/Time 9/23 14:46 9/23 14:46 9/23 14:46 9/23 14:46  9/22 20:00 9/23 14:46  Range  (36C - 36.7C )  (58 - 85 )  (12 - 19 )  (102 - 160 )/ (38 - 81 )  (59 - 76 )  (95% - 100% )         Weights   9/22 21:26: Weight in kg (Weight (kg))  65  9/22 21:26: Weight in lbs ((lbs))  143.3  9/22 21:26: BMI (kg/m2) (BMI (kg/m2))  25.422    Physical Exam by System:    Constitutional: alert, nad   Respiratory/Thorax: ctab    Cardiovascular: rrr   Gastrointestinal: soft,nt/nd     Medications:    Medications:          Continuous Medications       --------------------------------    1. Sodium Chloride 0.9% Infusion:  1000  mL  IntraVenous  <Continuous>         Scheduled Medications       --------------------------------    1. amLODIPine (NORVASC):  10  mg  Oral  Daily    2. Atorvastatin:  40  mg  Oral  Daily    3. Furosemide:  20  mg  Oral  Daily    4. levETIRAcetam (KEPPRA):  500  mg  Oral  2 Times a Day    5. Lisinopril:  40  mg  Oral  Daily    6. Pantoprazole Injectable:  40  mg  IntraVenous Push  Every 12 Hours         PRN Medications       --------------------------------    1. Acetaminophen:  650  mg  Oral  Every 4 Hours    2. Ondansetron Injectable:  4  mg  IntraVenous Push  Every 4 Hours           Currently Suspended Medications       --------------------------------    1. Aspirin Enteric Coated:  81  mg  Oral  Daily      Recent Lab Results:    Results:        I have reviewed these laboratory results:    Hemoglobin + Hematocrit  Trending View      Result 23-Sep-2023 13:04:00  23-Sep-2023 00:21:00    HCT 32.2   L   22.3   L    HGB 10.0   L   6.8   L        Basic Metabolic Panel  23-Sep-2023 13:04:00      Result Value    Glucose, Serum  78    NA  139    K  3.6    CL  108   H   Bicarbonate, Serum  21    Anion Gap, Serum  14    BUN  10    CREAT  1.04    GFR Female  52   A   Calcium, Serum  7.8   L     Complete Blood Count  23-Sep-2023 09:59:00      Result Value    White Blood Cell Count  6.4    Red Blood Cell Count  3.19   L   HGB  9.0   L   HCT  28.9   L   MCV  91    MCHC  31.1   L   PLT  241    RDW-CV  15.9   H     Lactate, Level  22-Sep-2023 18:44:00      Result Value    Lactate, Level  1.3      Hepatic Function Panel  22-Sep-2023 15:13:00      Result Value    Aspartate Transaminase, Serum  20    ALB  2.6   L   T Bili  0.5    Bilirubin, Serum Direct - Conjugated  0.1    ALKP  56    Alanine Aminotransferase, Serum  6   L   T Pro   "6.0   L       Radiology Results:    Results:        Impression:    1. No evidence of active extravasation.  2. Cholelithiasis without evidence of cholecystitis.  3. Nonspecific subcutaneous lesion at the umbilicus which may be post  surgical/post procedural in nature versus pathological. Recommend  correlation with patient history.  4. Additional chronic findings as noted above.      CTA Chest Abdomen + Pelvis [Sep 22 2023  7:49PM]        Assessment:    88yo with AF not on AC due to GIB, dementia, h/o CVA 2021, CKD, HTN presenting with hematochezia.  Pt was just recently d/c'd from [facility] 9/21  for rectal bleeding  that was found to be diverticular s/p colonoscopy with injection of epi and clip placement . EGD negative .  No furthr bleeding since admission; over increment in hgb following 1 unit , currently at 10.  Noted hematuria.      Diverticular bleed, possibly passing old blood and equilibrating.  IF develops recurrent clinically significant GIB will need IR intervention given recent endoscopic therapy     Monitor for recurrent rectal bleeding  supportive care per primary  urology has been consulted for hematuria  clear liquid diet, may advance to FLD if no bleeding later today    Consult Status:  Consult Status    (select all that apply): initial  consult complete, will follow   Consult Order ID: [id number]       Electronic Signatures:  Gloria Belle)  (Signed 23-Sep-2023 15:21)   Authored: Service, History of Present Illness, Review  Family/Social History and ROS, Allergies, Objective, Assessment/Recommendations, Note Completion      Last Updated: 23-Sep-2023 15:21 by Gloria Belle)    References:  1.  Data Referenced From \"History and Physical\" 22-Sep-2023 21:50   "

## 2024-03-06 NOTE — CONSULTS
Service:   Service: Urology     Consult:  Consult requested by (Attending Name): Stephanie Trotter   Reason: hematuria     History of Present Illness:   HPI:    MARY TESFAYE is a 87 year old Female who was recently at [facility] for a GI bleed.  She is now admitted to [facility] with a complaint of blood per rectum.  She has been  found to have blood in her urine now.  She denies having blood inher urine at home.  She has been incontinent here abut at home she has frequency but usually voids into the toilet.  She had a CT angio of the abdomen and pelvis showing no  abnormality.  She does have a.fib and is not on an anti-coagulant          Past medical history  Paroxysmal atrial fibrillation not currently on anticoagulation due to GI bleed on Xarelto  Dementia  Stroke in November 2021  Anemia  CKD 3  Hypertension  Hyperlipidemia  Cataracts  Seizure disorder  R frontal encephalomalacia, on Keppra  Recently hospitalized at [facility] with    Past surgical history  Hernia repair  Hysterectomy  Common bile duct stent  Cholecystectomy        Review Family/Social History and ROS:     Review Family/Social History and ROS:        Family History: reviewed and not pertinent to presenting problem  Alcohol Use: denies  Drug Use: denies    Patient ROS Incomplete due to patient's impaired mental status  History and Physical from Sep 22, 2023    Constitutional  Patient is  POSITIVE for Malaise and NEGATIVE for Fever, Chills, Anorexia, Weight Loss,     Eyes  Patient is  NEGATIVE for Blurry Vision, Drainage, Diploplia, Redness, Vision Loss/ Change    ENMT  Patient is  NEGATIVE for Nasal Discharge, Nasal Congestion, Ear Pain, Mouth Pain, Throat Pain    Respiratory  Patient is  NEGATIVE for Dry Cough, Productive Cough, Hemoptysis, Wheezing, Shortness of Breath    Cardiac  Patient is  NEGATIVE for Chest Pain, Dyspnea on Exertion, Orthopnea, Syncope    Gastrointestinal  Patient is  NEGATIVE for Nausea, Vomiting, Diarrhea, Constipation,  Abdominal Pain,  COMMENTS: Bright red blood per rectum    Genitourinary  Patient is  NEGATIVE for Discharge, Dysuria, Flank Pain, Frequency, Hematuria    Musculoskeletal  Patient is  NEGATIVE for Decreased ROM, Pain, Swelling, Stiffness, Weakness    Neurological  Patient is  NEGATIVE for Dizziness, Confusion, Headache, Seizures, Syncope    Psychiatric  Patient is  NEGATIVE for Mood Changes, Anxiety, Hallucinations, Sleep Changes, Suicidal Ideas    Skin  Patient is  NEGATIVE for Mass, Pain, Pruritus, Rash, Ulcer    Endocrine  Patient is  NEGATIVE for Heat Intolerance, Cold Intolerance, Sweat, Polyuria, Thirst    Hematologic/Lymph  Patient is  POSITIVE for Anemia,  and NEGATIVE for Bruising, Easy Bleeding, Night Sweats, Petechiae    Allergic/Immunologic  Patient is  NEGATIVE for Anaphylaxis, Itchy/ Teary Eyes, Itching, Sneezing, Swelling    Breast  Patient is  NEGATIVE for Pain, Mass, Discharge, Nipple Itching, Gynecomastia    All other systems reviewed and are negative    Social History:    Smoking Status: never smoker  (1)   Alcohol Use: denies (1)   Drug Use: denies  (1)            Allergies:  ·  No Known Allergies :     Objective:     Objective Information:        T   P  R  BP   MAP  SpO2   Value  36.6  73  16  146/53      96%  Date/Time 9/24 8:06 9/24 8:06 9/24 8:06 9/24 8:06    9/24 8:06  Range  (36C - 37C )  (58 - 78 )  (16 - 18 )  (124 - 169 )/ (53 - 81 )    (96% - 100% )  Highest temp of 37 C was recorded at 9/23 23:51        Pain reported at 9/24 8:06: 0 = None    Physical Exam by System:    Constitutional: Well developed, awake/alert/oriented  x3, no distress, alert and cooperative   Eyes: EOMI   Head/Neck: Supple   Respiratory/Thorax: Normal respiratory pattern   Gastrointestinal: No abdominal masses nor tenderness   Extremities: Moves all 4   Psychological: Appropriate mood and behavior   Skin: Dry     Medications:    Medications:          Continuous Medications        --------------------------------    1. Sodium Chloride 0.9% Infusion:  1000  mL  IntraVenous  <Continuous>         Scheduled Medications       --------------------------------    1. amLODIPine (NORVASC):  10  mg  Oral  Daily    2. Atorvastatin:  40  mg  Oral  Daily    3. Furosemide:  20  mg  Oral  Daily    4. levETIRAcetam (KEPPRA):  500  mg  Oral  2 Times a Day    5. Lisinopril:  40  mg  Oral  Daily    6. Pantoprazole Injectable:  40  mg  IntraVenous Push  Every 12 Hours         PRN Medications       --------------------------------    1. Acetaminophen:  650  mg  Oral  Every 4 Hours    2. Ondansetron Injectable:  4  mg  IntraVenous Push  Every 4 Hours           Currently Suspended Medications       --------------------------------    1. Aspirin Enteric Coated:  81  mg  Oral  Daily      Recent Lab Results:    Results:        I have reviewed these laboratory results:    Basic Metabolic Panel  23-Sep-2023 13:04:00      Result Value    Glucose, Serum  78    NA  139    K  3.6    CL  108   H   Bicarbonate, Serum  21    Anion Gap, Serum  14    BUN  10    CREAT  1.04    GFR Female  52   A   Calcium, Serum  7.8   L     Complete Blood Count  23-Sep-2023 09:59:00      Result Value    White Blood Cell Count  6.4    Red Blood Cell Count  3.19   L   HGB  9.0   L   HCT  28.9   L   MCV  91    MCHC  31.1   L   PLT  241    RDW-CV  15.9   H     Lactate, Level  22-Sep-2023 18:44:00      Result Value    Lactate, Level  1.3      Hepatic Function Panel  22-Sep-2023 15:13:00      Result Value    Aspartate Transaminase, Serum  20    ALB  2.6   L   T Bili  0.5    Bilirubin, Serum Direct - Conjugated  0.1    ALKP  56    Alanine Aminotransferase, Serum  6   L   T Pro  6.0   L     Complete Blood Count + Differential  22-Sep-2023 15:13:00      Result Value    White Blood Cell Count  5.9    Red Blood Cell Count  2.76   L   HGB  7.6   L   HCT  25.3   L   MCV  92    MCHC  30.0   L   PLT  277    RDW-CV  17.2   H   Neutrophil %  76.4    Immature  Granulocytes %  0.5    Lymphocyte %  12.5    Monocyte %  9.4    Eosinophil %  0.5    Basophil %  0.7    Neutrophil Count  4.54    Lymphocyte Count  0.74   L   Monocyte Count  0.56    Eosinophil Count  0.03    Basophil Count  0.04        Radiology Results:    Results:    I have reviewed this radiology result:         Impression:  CTA Chest Abdomen + Pelvis [Sep 22 2023  3:29PM]        Impression:    1. No evidence of active extravasation.  2. Cholelithiasis without evidence of cholecystitis.  3. Nonspecific subcutaneous lesion at the umbilicus which may be post  surgical/post procedural in nature versus pathological. Recommend  correlation with patient history.  4. Additional chronic findings as noted above.      CTA Chest Abdomen + Pelvis [Sep 22 2023  7:49PM]      Impression:  CTA Chest Abdomen + Pelvis [Sep 22 2023  6:43PM]      Impression:  CTA Chest Abdomen + Pelvis [Sep 22 2023  3:29PM]        Plan of Care Reviewed With:  Plan of Care Reviewed With: patient     Consult Status:  Consult Status    (select all that apply): initial  consult complete, will follow   Consult Order ID: [id number]     Problem List:       Additional Dx:   Diverticulosis of colon with hemorrhage:    Diverticulosis of large intestine:    Anemia due to acute blood loss:    Paroxysmal atrial fibrillation:    Body mass index (BMI) of 38.0 to 38.9 in adult:    Primary hypertension:    Stage 3 chronic kidney disease:    Hypertensive heart disease without heart failure:     Problem/Assessment/Plan:    Impression 1: Hematuria   Plan for Impression 1: She has blood in her urine  by the suction device.  Will have her get a straight cath for a UA and urine culture.  Will see if there is a UTI.  She has normal upper tracts.  If no UTI and significant hematuria she will need a cysto.       Electronic Signatures:  Vj Jorge)  (Signed 24-Sep-2023 08:28)   Authored: Service, History of Present Illness, Review  Family/Social History and ROS,  "Allergies, Objective, Assessment/Recommendations, Note Completion      Last Updated: 24-Sep-2023 08:28 by Vj Jroge)    References:  1.  Data Referenced From \"Consult-Gastroenterology\" 23-Sep-2023 15:07   "

## 2024-07-19 ENCOUNTER — LAB (OUTPATIENT)
Dept: LAB | Facility: LAB | Age: 89
End: 2024-07-19
Payer: MEDICARE

## 2024-07-19 DIAGNOSIS — N18.9 CHRONIC KIDNEY DISEASE, UNSPECIFIED: ICD-10-CM

## 2024-07-19 DIAGNOSIS — Z79.899 OTHER LONG TERM (CURRENT) DRUG THERAPY: ICD-10-CM

## 2024-07-19 DIAGNOSIS — E66.01 MORBID (SEVERE) OBESITY DUE TO EXCESS CALORIES (MULTI): ICD-10-CM

## 2024-07-19 DIAGNOSIS — E03.9 HYPOTHYROIDISM, UNSPECIFIED: ICD-10-CM

## 2024-07-19 DIAGNOSIS — Z13.21 ENCOUNTER FOR SCREENING FOR NUTRITIONAL DISORDER: ICD-10-CM

## 2024-07-19 DIAGNOSIS — D50.9 IRON DEFICIENCY ANEMIA, UNSPECIFIED: ICD-10-CM

## 2024-07-19 LAB
BASOPHILS # BLD AUTO: 0.02 X10*3/UL (ref 0–0.1)
BASOPHILS NFR BLD AUTO: 0.6 %
EOSINOPHIL # BLD AUTO: 0.02 X10*3/UL (ref 0–0.4)
EOSINOPHIL NFR BLD AUTO: 0.6 %
ERYTHROCYTE [DISTWIDTH] IN BLOOD BY AUTOMATED COUNT: 17 % (ref 11.5–14.5)
HCT VFR BLD AUTO: 38.9 % (ref 36–46)
HGB BLD-MCNC: 12.4 G/DL (ref 12–16)
IMM GRANULOCYTES # BLD AUTO: 0.01 X10*3/UL (ref 0–0.5)
IMM GRANULOCYTES NFR BLD AUTO: 0.3 % (ref 0–0.9)
LYMPHOCYTES # BLD AUTO: 1.11 X10*3/UL (ref 0.8–3)
LYMPHOCYTES NFR BLD AUTO: 31.8 %
MCH RBC QN AUTO: 27 PG (ref 26–34)
MCHC RBC AUTO-ENTMCNC: 31.9 G/DL (ref 32–36)
MCV RBC AUTO: 85 FL (ref 80–100)
MONOCYTES # BLD AUTO: 0.25 X10*3/UL (ref 0.05–0.8)
MONOCYTES NFR BLD AUTO: 7.2 %
NEUTROPHILS # BLD AUTO: 2.08 X10*3/UL (ref 1.6–5.5)
NEUTROPHILS NFR BLD AUTO: 59.5 %
NRBC BLD-RTO: 0 /100 WBCS (ref 0–0)
PLATELET # BLD AUTO: 285 X10*3/UL (ref 150–450)
PTH-INTACT SERPL-MCNC: 70.3 PG/ML (ref 18.5–88)
RBC # BLD AUTO: 4.59 X10*6/UL (ref 4–5.2)
T4 FREE SERPL-MCNC: 0.96 NG/DL (ref 0.78–1.48)
TSH SERPL-ACNC: 3.94 MIU/L (ref 0.44–3.98)
WBC # BLD AUTO: 3.5 X10*3/UL (ref 4.4–11.3)

## 2024-07-19 PROCEDURE — 36415 COLL VENOUS BLD VENIPUNCTURE: CPT

## 2024-07-19 PROCEDURE — 80053 COMPREHEN METABOLIC PANEL: CPT

## 2024-07-19 PROCEDURE — 84439 ASSAY OF FREE THYROXINE: CPT

## 2024-07-19 PROCEDURE — 83970 ASSAY OF PARATHORMONE: CPT

## 2024-07-19 PROCEDURE — 80061 LIPID PANEL: CPT

## 2024-07-19 PROCEDURE — 85025 COMPLETE CBC W/AUTO DIFF WBC: CPT

## 2024-07-19 PROCEDURE — 84443 ASSAY THYROID STIM HORMONE: CPT

## 2024-07-19 PROCEDURE — 82728 ASSAY OF FERRITIN: CPT

## 2024-07-20 LAB
ALBUMIN SERPL BCP-MCNC: 3.2 G/DL (ref 3.4–5)
ALP SERPL-CCNC: 75 U/L (ref 33–136)
ALT SERPL W P-5'-P-CCNC: 9 U/L (ref 7–45)
ANION GAP SERPL CALC-SCNC: 15 MMOL/L (ref 10–20)
AST SERPL W P-5'-P-CCNC: 23 U/L (ref 9–39)
BILIRUB SERPL-MCNC: 0.8 MG/DL (ref 0–1.2)
BUN SERPL-MCNC: 18 MG/DL (ref 6–23)
CALCIUM SERPL-MCNC: 9.3 MG/DL (ref 8.6–10.6)
CHLORIDE SERPL-SCNC: 99 MMOL/L (ref 98–107)
CHOLEST SERPL-MCNC: 152 MG/DL (ref 0–199)
CHOLESTEROL/HDL RATIO: 3.4
CO2 SERPL-SCNC: 27 MMOL/L (ref 21–32)
CREAT SERPL-MCNC: 0.9 MG/DL (ref 0.5–1.05)
EGFRCR SERPLBLD CKD-EPI 2021: 62 ML/MIN/1.73M*2
FERRITIN SERPL-MCNC: 704 NG/ML (ref 8–150)
GLUCOSE SERPL-MCNC: 99 MG/DL (ref 74–99)
HDLC SERPL-MCNC: 44.2 MG/DL
LDLC SERPL CALC-MCNC: 96 MG/DL
NON HDL CHOLESTEROL: 108 MG/DL (ref 0–149)
POTASSIUM SERPL-SCNC: 4.2 MMOL/L (ref 3.5–5.3)
PROT SERPL-MCNC: 7.5 G/DL (ref 6.4–8.2)
SODIUM SERPL-SCNC: 137 MMOL/L (ref 136–145)
TRIGL SERPL-MCNC: 61 MG/DL (ref 0–149)
VLDL: 12 MG/DL (ref 0–40)

## 2024-11-17 ENCOUNTER — APPOINTMENT (OUTPATIENT)
Dept: RADIOLOGY | Facility: HOSPITAL | Age: 89
DRG: 446 | End: 2024-11-17
Payer: MEDICARE

## 2024-11-17 ENCOUNTER — APPOINTMENT (OUTPATIENT)
Dept: CARDIOLOGY | Facility: HOSPITAL | Age: 89
DRG: 446 | End: 2024-11-17
Payer: MEDICARE

## 2024-11-17 ENCOUNTER — HOSPITAL ENCOUNTER (INPATIENT)
Facility: HOSPITAL | Age: 89
DRG: 446 | End: 2024-11-17
Attending: EMERGENCY MEDICINE | Admitting: INTERNAL MEDICINE
Payer: MEDICARE

## 2024-11-17 VITALS
TEMPERATURE: 98 F | HEART RATE: 60 BPM | WEIGHT: 143.3 LBS | HEIGHT: 58 IN | DIASTOLIC BLOOD PRESSURE: 59 MMHG | SYSTOLIC BLOOD PRESSURE: 117 MMHG | RESPIRATION RATE: 16 BRPM | OXYGEN SATURATION: 93 % | BODY MASS INDEX: 30.08 KG/M2

## 2024-11-17 DIAGNOSIS — K81.0 ACUTE CHOLECYSTITIS: Primary | ICD-10-CM

## 2024-11-17 LAB
ALBUMIN SERPL BCP-MCNC: 3.3 G/DL (ref 3.4–5)
ALP SERPL-CCNC: 228 U/L (ref 33–136)
ALT SERPL W P-5'-P-CCNC: 42 U/L (ref 7–45)
ANION GAP SERPL CALC-SCNC: 16 MMOL/L (ref 10–20)
APTT PPP: 51 SECONDS (ref 27–38)
AST SERPL W P-5'-P-CCNC: 146 U/L (ref 9–39)
BASOPHILS # BLD AUTO: 0.02 X10*3/UL (ref 0–0.1)
BASOPHILS NFR BLD AUTO: 0.3 %
BILIRUB SERPL-MCNC: 1.8 MG/DL (ref 0–1.2)
BNP SERPL-MCNC: 419 PG/ML (ref 0–99)
BUN SERPL-MCNC: 11 MG/DL (ref 6–23)
CALCIUM SERPL-MCNC: 9.1 MG/DL (ref 8.6–10.3)
CARDIAC TROPONIN I PNL SERPL HS: 40 NG/L (ref 0–13)
CARDIAC TROPONIN I PNL SERPL HS: 48 NG/L (ref 0–13)
CHLORIDE SERPL-SCNC: 97 MMOL/L (ref 98–107)
CO2 SERPL-SCNC: 26 MMOL/L (ref 21–32)
CREAT SERPL-MCNC: 1.04 MG/DL (ref 0.5–1.05)
EGFRCR SERPLBLD CKD-EPI 2021: 51 ML/MIN/1.73M*2
EOSINOPHIL # BLD AUTO: 0.04 X10*3/UL (ref 0–0.4)
EOSINOPHIL NFR BLD AUTO: 0.5 %
ERYTHROCYTE [DISTWIDTH] IN BLOOD BY AUTOMATED COUNT: 15.7 % (ref 11.5–14.5)
GLUCOSE SERPL-MCNC: 122 MG/DL (ref 74–99)
HCT VFR BLD AUTO: 39.9 % (ref 36–46)
HGB BLD-MCNC: 13 G/DL (ref 12–16)
IMM GRANULOCYTES # BLD AUTO: 0.03 X10*3/UL (ref 0–0.5)
IMM GRANULOCYTES NFR BLD AUTO: 0.4 % (ref 0–0.9)
INR PPP: 1.1 (ref 0.9–1.1)
LIPASE SERPL-CCNC: 42 U/L (ref 9–82)
LYMPHOCYTES # BLD AUTO: 0.64 X10*3/UL (ref 0.8–3)
LYMPHOCYTES NFR BLD AUTO: 8.1 %
MAGNESIUM SERPL-MCNC: 1.88 MG/DL (ref 1.6–2.4)
MCH RBC QN AUTO: 27.8 PG (ref 26–34)
MCHC RBC AUTO-ENTMCNC: 32.6 G/DL (ref 32–36)
MCV RBC AUTO: 85 FL (ref 80–100)
MONOCYTES # BLD AUTO: 0.42 X10*3/UL (ref 0.05–0.8)
MONOCYTES NFR BLD AUTO: 5.3 %
NEUTROPHILS # BLD AUTO: 6.71 X10*3/UL (ref 1.6–5.5)
NEUTROPHILS NFR BLD AUTO: 85.4 %
NRBC BLD-RTO: 0 /100 WBCS (ref 0–0)
PLATELET # BLD AUTO: 294 X10*3/UL (ref 150–450)
POTASSIUM SERPL-SCNC: 3.8 MMOL/L (ref 3.5–5.3)
PROT SERPL-MCNC: 7.9 G/DL (ref 6.4–8.2)
PROTHROMBIN TIME: 12.7 SECONDS (ref 9.8–12.8)
RBC # BLD AUTO: 4.68 X10*6/UL (ref 4–5.2)
SARS-COV-2 RNA RESP QL NAA+PROBE: NOT DETECTED
SODIUM SERPL-SCNC: 135 MMOL/L (ref 136–145)
WBC # BLD AUTO: 7.9 X10*3/UL (ref 4.4–11.3)

## 2024-11-17 PROCEDURE — 93005 ELECTROCARDIOGRAM TRACING: CPT

## 2024-11-17 PROCEDURE — 71045 X-RAY EXAM CHEST 1 VIEW: CPT | Performed by: STUDENT IN AN ORGANIZED HEALTH CARE EDUCATION/TRAINING PROGRAM

## 2024-11-17 PROCEDURE — 83880 ASSAY OF NATRIURETIC PEPTIDE: CPT | Performed by: EMERGENCY MEDICINE

## 2024-11-17 PROCEDURE — 51701 INSERT BLADDER CATHETER: CPT

## 2024-11-17 PROCEDURE — 76705 ECHO EXAM OF ABDOMEN: CPT

## 2024-11-17 PROCEDURE — 2550000001 HC RX 255 CONTRASTS: Performed by: EMERGENCY MEDICINE

## 2024-11-17 PROCEDURE — 87635 SARS-COV-2 COVID-19 AMP PRB: CPT | Performed by: EMERGENCY MEDICINE

## 2024-11-17 PROCEDURE — 76705 ECHO EXAM OF ABDOMEN: CPT | Performed by: STUDENT IN AN ORGANIZED HEALTH CARE EDUCATION/TRAINING PROGRAM

## 2024-11-17 PROCEDURE — 71045 X-RAY EXAM CHEST 1 VIEW: CPT

## 2024-11-17 PROCEDURE — 80053 COMPREHEN METABOLIC PANEL: CPT | Performed by: EMERGENCY MEDICINE

## 2024-11-17 PROCEDURE — 96365 THER/PROPH/DIAG IV INF INIT: CPT | Mod: 59

## 2024-11-17 PROCEDURE — 99222 1ST HOSP IP/OBS MODERATE 55: CPT | Performed by: INTERNAL MEDICINE

## 2024-11-17 PROCEDURE — 84484 ASSAY OF TROPONIN QUANT: CPT | Performed by: EMERGENCY MEDICINE

## 2024-11-17 PROCEDURE — 85025 COMPLETE CBC W/AUTO DIFF WBC: CPT | Performed by: EMERGENCY MEDICINE

## 2024-11-17 PROCEDURE — 83735 ASSAY OF MAGNESIUM: CPT | Performed by: EMERGENCY MEDICINE

## 2024-11-17 PROCEDURE — 74177 CT ABD & PELVIS W/CONTRAST: CPT | Performed by: RADIOLOGY

## 2024-11-17 PROCEDURE — 2500000004 HC RX 250 GENERAL PHARMACY W/ HCPCS (ALT 636 FOR OP/ED): Performed by: EMERGENCY MEDICINE

## 2024-11-17 PROCEDURE — 2500000004 HC RX 250 GENERAL PHARMACY W/ HCPCS (ALT 636 FOR OP/ED): Performed by: INTERNAL MEDICINE

## 2024-11-17 PROCEDURE — 85730 THROMBOPLASTIN TIME PARTIAL: CPT | Performed by: EMERGENCY MEDICINE

## 2024-11-17 PROCEDURE — 96375 TX/PRO/DX INJ NEW DRUG ADDON: CPT | Mod: 59

## 2024-11-17 PROCEDURE — 36415 COLL VENOUS BLD VENIPUNCTURE: CPT | Performed by: EMERGENCY MEDICINE

## 2024-11-17 PROCEDURE — 99285 EMERGENCY DEPT VISIT HI MDM: CPT | Mod: 25

## 2024-11-17 PROCEDURE — 74177 CT ABD & PELVIS W/CONTRAST: CPT

## 2024-11-17 PROCEDURE — 85610 PROTHROMBIN TIME: CPT | Performed by: EMERGENCY MEDICINE

## 2024-11-17 PROCEDURE — 2500000001 HC RX 250 WO HCPCS SELF ADMINISTERED DRUGS (ALT 637 FOR MEDICARE OP): Performed by: INTERNAL MEDICINE

## 2024-11-17 PROCEDURE — 99222 1ST HOSP IP/OBS MODERATE 55: CPT | Performed by: STUDENT IN AN ORGANIZED HEALTH CARE EDUCATION/TRAINING PROGRAM

## 2024-11-17 PROCEDURE — 83690 ASSAY OF LIPASE: CPT | Performed by: EMERGENCY MEDICINE

## 2024-11-17 PROCEDURE — 1100000001 HC PRIVATE ROOM DAILY

## 2024-11-17 RX ORDER — NAPROXEN SODIUM 220 MG/1
81 TABLET, FILM COATED ORAL DAILY
Status: DISCONTINUED | OUTPATIENT
Start: 2024-11-17 | End: 2024-11-19 | Stop reason: HOSPADM

## 2024-11-17 RX ORDER — LISINOPRIL 40 MG/1
40 TABLET ORAL DAILY
COMMUNITY

## 2024-11-17 RX ORDER — SODIUM CHLORIDE 9 MG/ML
100 INJECTION, SOLUTION INTRAVENOUS CONTINUOUS
Status: DISCONTINUED | OUTPATIENT
Start: 2024-11-17 | End: 2024-11-17

## 2024-11-17 RX ORDER — LEVETIRACETAM 5 MG/ML
500 INJECTION INTRAVASCULAR EVERY 12 HOURS
Status: DISCONTINUED | OUTPATIENT
Start: 2024-11-17 | End: 2024-11-19 | Stop reason: HOSPADM

## 2024-11-17 RX ORDER — ONDANSETRON HYDROCHLORIDE 2 MG/ML
4 INJECTION, SOLUTION INTRAVENOUS ONCE
Status: COMPLETED | OUTPATIENT
Start: 2024-11-17 | End: 2024-11-17

## 2024-11-17 RX ORDER — LISINOPRIL 20 MG/1
40 TABLET ORAL DAILY
Status: DISCONTINUED | OUTPATIENT
Start: 2024-11-17 | End: 2024-11-19 | Stop reason: HOSPADM

## 2024-11-17 RX ORDER — ATORVASTATIN CALCIUM 40 MG/1
40 TABLET, FILM COATED ORAL NIGHTLY
COMMUNITY

## 2024-11-17 RX ORDER — PANTOPRAZOLE SODIUM 40 MG/1
40 TABLET, DELAYED RELEASE ORAL
Status: DISCONTINUED | OUTPATIENT
Start: 2024-11-17 | End: 2024-11-19 | Stop reason: HOSPADM

## 2024-11-17 RX ORDER — ACETAMINOPHEN 325 MG/1
650 TABLET ORAL EVERY 4 HOURS PRN
Status: DISCONTINUED | OUTPATIENT
Start: 2024-11-17 | End: 2024-11-19 | Stop reason: HOSPADM

## 2024-11-17 RX ORDER — AMLODIPINE BESYLATE 10 MG/1
10 TABLET ORAL DAILY
COMMUNITY

## 2024-11-17 RX ORDER — ONDANSETRON HYDROCHLORIDE 2 MG/ML
4 INJECTION, SOLUTION INTRAVENOUS EVERY 8 HOURS PRN
Status: DISCONTINUED | OUTPATIENT
Start: 2024-11-17 | End: 2024-11-19 | Stop reason: HOSPADM

## 2024-11-17 RX ORDER — NAPROXEN SODIUM 220 MG/1
81 TABLET, FILM COATED ORAL DAILY
COMMUNITY

## 2024-11-17 RX ORDER — HEPARIN SODIUM 5000 [USP'U]/ML
5000 INJECTION, SOLUTION INTRAVENOUS; SUBCUTANEOUS EVERY 8 HOURS
Status: DISCONTINUED | OUTPATIENT
Start: 2024-11-17 | End: 2024-11-19 | Stop reason: HOSPADM

## 2024-11-17 RX ORDER — ATORVASTATIN CALCIUM 40 MG/1
40 TABLET, FILM COATED ORAL NIGHTLY
Status: DISCONTINUED | OUTPATIENT
Start: 2024-11-17 | End: 2024-11-19 | Stop reason: HOSPADM

## 2024-11-17 RX ORDER — SODIUM CHLORIDE 9 MG/ML
75 INJECTION, SOLUTION INTRAVENOUS CONTINUOUS
Status: ACTIVE | OUTPATIENT
Start: 2024-11-17 | End: 2024-11-18

## 2024-11-17 RX ORDER — ACETAMINOPHEN 160 MG/5ML
650 SOLUTION ORAL EVERY 4 HOURS PRN
Status: DISCONTINUED | OUTPATIENT
Start: 2024-11-17 | End: 2024-11-19 | Stop reason: HOSPADM

## 2024-11-17 RX ORDER — FUROSEMIDE 20 MG/1
10 TABLET ORAL DAILY
COMMUNITY

## 2024-11-17 RX ORDER — ACETAMINOPHEN 650 MG/1
650 SUPPOSITORY RECTAL EVERY 4 HOURS PRN
Status: DISCONTINUED | OUTPATIENT
Start: 2024-11-17 | End: 2024-11-19 | Stop reason: HOSPADM

## 2024-11-17 RX ORDER — HYDROMORPHONE HYDROCHLORIDE 0.2 MG/ML
0.2 INJECTION INTRAMUSCULAR; INTRAVENOUS; SUBCUTANEOUS EVERY 4 HOURS PRN
Status: DISCONTINUED | OUTPATIENT
Start: 2024-11-17 | End: 2024-11-19 | Stop reason: HOSPADM

## 2024-11-17 RX ORDER — LEVOTHYROXINE SODIUM 25 UG/1
25 TABLET ORAL DAILY
COMMUNITY

## 2024-11-17 RX ORDER — AMLODIPINE BESYLATE 10 MG/1
10 TABLET ORAL DAILY
Status: DISCONTINUED | OUTPATIENT
Start: 2024-11-17 | End: 2024-11-19 | Stop reason: HOSPADM

## 2024-11-17 RX ORDER — PANTOPRAZOLE SODIUM 40 MG/10ML
40 INJECTION, POWDER, LYOPHILIZED, FOR SOLUTION INTRAVENOUS
Status: DISCONTINUED | OUTPATIENT
Start: 2024-11-17 | End: 2024-11-19 | Stop reason: HOSPADM

## 2024-11-17 RX ORDER — LISINOPRIL 10 MG/1
10 TABLET ORAL DAILY
Status: DISCONTINUED | OUTPATIENT
Start: 2024-11-17 | End: 2024-11-17

## 2024-11-17 RX ORDER — LEVETIRACETAM 500 MG/1
500 TABLET ORAL 2 TIMES DAILY
COMMUNITY

## 2024-11-17 RX ORDER — MORPHINE SULFATE 4 MG/ML
4 INJECTION, SOLUTION INTRAMUSCULAR; INTRAVENOUS ONCE
Status: COMPLETED | OUTPATIENT
Start: 2024-11-17 | End: 2024-11-17

## 2024-11-17 RX ORDER — ONDANSETRON 4 MG/1
4 TABLET, FILM COATED ORAL EVERY 8 HOURS PRN
Status: DISCONTINUED | OUTPATIENT
Start: 2024-11-17 | End: 2024-11-19 | Stop reason: HOSPADM

## 2024-11-17 RX ADMIN — PIPERACILLIN SODIUM AND TAZOBACTAM SODIUM 3.38 G: 3; .375 INJECTION, SOLUTION INTRAVENOUS at 12:03

## 2024-11-17 RX ADMIN — HEPARIN SODIUM 5000 UNITS: 5000 INJECTION, SOLUTION INTRAVENOUS; SUBCUTANEOUS at 05:20

## 2024-11-17 RX ADMIN — LISINOPRIL 40 MG: 20 TABLET ORAL at 10:01

## 2024-11-17 RX ADMIN — SODIUM CHLORIDE 100 ML/HR: 9 INJECTION, SOLUTION INTRAVENOUS at 05:20

## 2024-11-17 RX ADMIN — PANTOPRAZOLE SODIUM 40 MG: 40 INJECTION, POWDER, FOR SOLUTION INTRAVENOUS at 06:27

## 2024-11-17 RX ADMIN — SODIUM CHLORIDE 75 ML/HR: 9 INJECTION, SOLUTION INTRAVENOUS at 05:43

## 2024-11-17 RX ADMIN — HEPARIN SODIUM 5000 UNITS: 5000 INJECTION, SOLUTION INTRAVENOUS; SUBCUTANEOUS at 21:38

## 2024-11-17 RX ADMIN — PIPERACILLIN SODIUM AND TAZOBACTAM SODIUM 3.38 G: 3; .375 INJECTION, SOLUTION INTRAVENOUS at 04:43

## 2024-11-17 RX ADMIN — PIPERACILLIN SODIUM AND TAZOBACTAM SODIUM 3.38 G: 3; .375 INJECTION, SOLUTION INTRAVENOUS at 17:35

## 2024-11-17 RX ADMIN — ONDANSETRON 4 MG: 2 INJECTION, SOLUTION INTRAMUSCULAR; INTRAVENOUS at 05:09

## 2024-11-17 RX ADMIN — LEVETIRACETAM 500 MG: 5 INJECTION INTRAVENOUS at 21:38

## 2024-11-17 RX ADMIN — IOHEXOL 75 ML: 350 INJECTION, SOLUTION INTRAVENOUS at 04:11

## 2024-11-17 RX ADMIN — PIPERACILLIN SODIUM AND TAZOBACTAM SODIUM 3.38 G: 3; .375 INJECTION, SOLUTION INTRAVENOUS at 22:57

## 2024-11-17 RX ADMIN — MORPHINE SULFATE 4 MG: 4 INJECTION, SOLUTION INTRAMUSCULAR; INTRAVENOUS at 05:09

## 2024-11-17 SDOH — SOCIAL STABILITY: SOCIAL INSECURITY: HAVE YOU HAD ANY THOUGHTS OF HARMING ANYONE ELSE?: NO

## 2024-11-17 SDOH — SOCIAL STABILITY: SOCIAL INSECURITY
WITHIN THE LAST YEAR, HAVE YOU BEEN RAPED OR FORCED TO HAVE ANY KIND OF SEXUAL ACTIVITY BY YOUR PARTNER OR EX-PARTNER?: NO

## 2024-11-17 SDOH — SOCIAL STABILITY: SOCIAL INSECURITY: ABUSE: ADULT

## 2024-11-17 SDOH — SOCIAL STABILITY: SOCIAL INSECURITY: DO YOU FEEL UNSAFE GOING BACK TO THE PLACE WHERE YOU ARE LIVING?: NO

## 2024-11-17 SDOH — HEALTH STABILITY: MENTAL HEALTH: HOW MANY DRINKS CONTAINING ALCOHOL DO YOU HAVE ON A TYPICAL DAY WHEN YOU ARE DRINKING?: PATIENT DOES NOT DRINK

## 2024-11-17 SDOH — HEALTH STABILITY: MENTAL HEALTH: HOW OFTEN DO YOU HAVE SIX OR MORE DRINKS ON ONE OCCASION?: NEVER

## 2024-11-17 SDOH — SOCIAL STABILITY: SOCIAL INSECURITY: WITHIN THE LAST YEAR, HAVE YOU BEEN AFRAID OF YOUR PARTNER OR EX-PARTNER?: NO

## 2024-11-17 SDOH — SOCIAL STABILITY: SOCIAL INSECURITY
WITHIN THE LAST YEAR, HAVE YOU BEEN KICKED, HIT, SLAPPED, OR OTHERWISE PHYSICALLY HURT BY YOUR PARTNER OR EX-PARTNER?: NO

## 2024-11-17 SDOH — SOCIAL STABILITY: SOCIAL INSECURITY: WITHIN THE LAST YEAR, HAVE YOU BEEN HUMILIATED OR EMOTIONALLY ABUSED IN OTHER WAYS BY YOUR PARTNER OR EX-PARTNER?: NO

## 2024-11-17 SDOH — SOCIAL STABILITY: SOCIAL INSECURITY: ARE THERE ANY APPARENT SIGNS OF INJURIES/BEHAVIORS THAT COULD BE RELATED TO ABUSE/NEGLECT?: NO

## 2024-11-17 SDOH — ECONOMIC STABILITY: FOOD INSECURITY: WITHIN THE PAST 12 MONTHS, THE FOOD YOU BOUGHT JUST DIDN'T LAST AND YOU DIDN'T HAVE MONEY TO GET MORE.: NEVER TRUE

## 2024-11-17 SDOH — ECONOMIC STABILITY: FOOD INSECURITY: WITHIN THE PAST 12 MONTHS, YOU WORRIED THAT YOUR FOOD WOULD RUN OUT BEFORE YOU GOT THE MONEY TO BUY MORE.: NEVER TRUE

## 2024-11-17 SDOH — HEALTH STABILITY: MENTAL HEALTH: HOW OFTEN DO YOU HAVE A DRINK CONTAINING ALCOHOL?: NEVER

## 2024-11-17 SDOH — SOCIAL STABILITY: SOCIAL INSECURITY: HAS ANYONE EVER THREATENED TO HURT YOUR FAMILY OR YOUR PETS?: NO

## 2024-11-17 SDOH — SOCIAL STABILITY: SOCIAL INSECURITY: DO YOU FEEL ANYONE HAS EXPLOITED OR TAKEN ADVANTAGE OF YOU FINANCIALLY OR OF YOUR PERSONAL PROPERTY?: NO

## 2024-11-17 SDOH — SOCIAL STABILITY: SOCIAL INSECURITY: WERE YOU ABLE TO COMPLETE ALL THE BEHAVIORAL HEALTH SCREENINGS?: YES

## 2024-11-17 SDOH — SOCIAL STABILITY: SOCIAL INSECURITY: DOES ANYONE TRY TO KEEP YOU FROM HAVING/CONTACTING OTHER FRIENDS OR DOING THINGS OUTSIDE YOUR HOME?: NO

## 2024-11-17 SDOH — SOCIAL STABILITY: SOCIAL INSECURITY: HAVE YOU HAD THOUGHTS OF HARMING ANYONE ELSE?: NO

## 2024-11-17 SDOH — ECONOMIC STABILITY: INCOME INSECURITY: IN THE PAST 12 MONTHS HAS THE ELECTRIC, GAS, OIL, OR WATER COMPANY THREATENED TO SHUT OFF SERVICES IN YOUR HOME?: NO

## 2024-11-17 SDOH — SOCIAL STABILITY: SOCIAL INSECURITY: ARE YOU OR HAVE YOU BEEN THREATENED OR ABUSED PHYSICALLY, EMOTIONALLY, OR SEXUALLY BY ANYONE?: NO

## 2024-11-17 ASSESSMENT — PAIN SCALES - GENERAL
PAINLEVEL_OUTOF10: 5 - MODERATE PAIN
PAINLEVEL_OUTOF10: 8
PAINLEVEL_OUTOF10: 6

## 2024-11-17 ASSESSMENT — ENCOUNTER SYMPTOMS
ABDOMINAL PAIN: 1
EYES NEGATIVE: 1
CONFUSION: 1
FATIGUE: 1
CARDIOVASCULAR NEGATIVE: 1
NEUROLOGICAL NEGATIVE: 1
MUSCULOSKELETAL NEGATIVE: 1
VOMITING: 1
CONSTITUTIONAL NEGATIVE: 1
RESPIRATORY NEGATIVE: 1
ALLERGIC/IMMUNOLOGIC NEGATIVE: 1
NAUSEA: 1
HEMATOLOGIC/LYMPHATIC NEGATIVE: 1
APPETITE CHANGE: 1
ACTIVITY CHANGE: 1
ENDOCRINE NEGATIVE: 1

## 2024-11-17 ASSESSMENT — ACTIVITIES OF DAILY LIVING (ADL)
DRESSING YOURSELF: INDEPENDENT
ADEQUATE_TO_COMPLETE_ADL: YES
GROOMING: INDEPENDENT
PATIENT'S MEMORY ADEQUATE TO SAFELY COMPLETE DAILY ACTIVITIES?: YES
WALKS IN HOME: INDEPENDENT
HEARING - RIGHT EAR: FUNCTIONAL
FEEDING YOURSELF: INDEPENDENT
ASSISTIVE_DEVICE: WALKER
LACK_OF_TRANSPORTATION: NO
JUDGMENT_ADEQUATE_SAFELY_COMPLETE_DAILY_ACTIVITIES: YES
BATHING: INDEPENDENT
HEARING - LEFT EAR: FUNCTIONAL
TOILETING: INDEPENDENT

## 2024-11-17 ASSESSMENT — PAIN DESCRIPTION - DESCRIPTORS: DESCRIPTORS: ACHING

## 2024-11-17 ASSESSMENT — LIFESTYLE VARIABLES
SKIP TO QUESTIONS 9-10: 1
AUDIT-C TOTAL SCORE: 0

## 2024-11-17 ASSESSMENT — PATIENT HEALTH QUESTIONNAIRE - PHQ9
1. LITTLE INTEREST OR PLEASURE IN DOING THINGS: NOT AT ALL
SUM OF ALL RESPONSES TO PHQ9 QUESTIONS 1 & 2: 0
2. FEELING DOWN, DEPRESSED OR HOPELESS: NOT AT ALL

## 2024-11-17 ASSESSMENT — COLUMBIA-SUICIDE SEVERITY RATING SCALE - C-SSRS
2. HAVE YOU ACTUALLY HAD ANY THOUGHTS OF KILLING YOURSELF?: NO
6. HAVE YOU EVER DONE ANYTHING, STARTED TO DO ANYTHING, OR PREPARED TO DO ANYTHING TO END YOUR LIFE?: NO
1. IN THE PAST MONTH, HAVE YOU WISHED YOU WERE DEAD OR WISHED YOU COULD GO TO SLEEP AND NOT WAKE UP?: NO

## 2024-11-17 ASSESSMENT — PAIN DESCRIPTION - PAIN TYPE: TYPE: ACUTE PAIN

## 2024-11-17 ASSESSMENT — PAIN DESCRIPTION - ORIENTATION
ORIENTATION: RIGHT
ORIENTATION: MID

## 2024-11-17 ASSESSMENT — COGNITIVE AND FUNCTIONAL STATUS - GENERAL: PATIENT BASELINE BEDBOUND: UNABLE TO ASSESS AT THIS TIME

## 2024-11-17 ASSESSMENT — PAIN - FUNCTIONAL ASSESSMENT
PAIN_FUNCTIONAL_ASSESSMENT: 0-10
PAIN_FUNCTIONAL_ASSESSMENT: 0-10

## 2024-11-17 ASSESSMENT — PAIN DESCRIPTION - LOCATION: LOCATION: ABDOMEN

## 2024-11-17 NOTE — ED PROVIDER NOTES
HPI   Chief Complaint   Patient presents with    Abdominal Pain       Patient presents with recurrent pain in a began today or yesterday.  History 7 by dementia.  Patient lives with his December 2000.  She does know she is in the hospital and her name.  She states that she may have had some vomiting but currently does not feel nauseous.  She does not desire any medication currently.  Said it was more severe earlier.      History limited by:  Dementia          Patient History   History reviewed. No pertinent past medical history.  Past Surgical History:   Procedure Laterality Date    CT ABDOMEN PELVIS ANGIOGRAM W AND/OR WO IV CONTRAST  9/22/2023    CT ABDOMEN PELVIS ANGIOGRAM W AND/OR WO IV CONTRAST 9/22/2023 [place] [facility]    MR HEAD ANGIO WO IV CONTRAST  6/24/2022    MR HEAD ANGIO WO IV CONTRAST 6/24/2022 [facility]ICAL [facility]    MR NECK ANGIO WO IV CONTRAST  6/24/2022    MR NECK ANGIO WO IV CONTRAST 6/24/2022 [facility] CL[facility]ICAL [facility]GAC[facility]     No family history on file.  Social History     Tobacco Use    Smoking status: Never    Smokeless tobacco: Never   Substance Use Topics    Alcohol use: Not on file    Drug use: Not on file       Physical Exam   ED Triage Vitals   Temp Pulse Resp BP   -- -- -- --      SpO2 Temp src Heart Rate Source Patient Position   -- -- -- --      BP Location FiO2 (%)     -- --       Physical Exam  Vitals and nursing note reviewed.   Constitutional:       General: She is not in acute distress.     Appearance: She is well-developed.   HENT:      Head: Normocephalic and atraumatic.   Eyes:      Conjunctiva/sclera: Conjunctivae normal.   Cardiovascular:      Rate and Rhythm: Normal rate and regular rhythm.      Heart sounds: No murmur heard.  Pulmonary:      Effort: Pulmonary effort is normal. No respiratory distress.      Breath sounds: Normal breath sounds.   Abdominal:      Palpations: Abdomen is soft.      Tenderness: There is abdominal tenderness in the right upper quadrant. There is no guarding or  rebound. Negative signs include Valdovinos's sign.   Musculoskeletal:         General: No swelling.      Cervical back: Neck supple.   Skin:     General: Skin is warm and dry.      Capillary Refill: Capillary refill takes less than 2 seconds.   Neurological:      Mental Status: She is alert.   Psychiatric:         Mood and Affect: Mood normal.           ED Course & MDM   Diagnoses as of 11/17/24 2032   Acute cholecystitis                 No data recorded     [place] Coma Scale Score: 15 (11/17/24 0802 : Mireya Almodovar RN)                           Medical Decision Making  The patient is no guarding or rebound.  Given her age will obtain CT scan.  Patient does have history of A-fib possibly on blood thinners.  Will do a cardiac evaluation as well.  I talked with her granddaughter who is not a power of  but does state the patient has a living will and is DNR.  Patient is not normal with previous surgery.  Patient was found to have a cholecystitis with gallbladder perforation of seems to be contained.  Surgery is updated.  Will admit to the hospitalist.    EKG interpreted by myself.  Atrial fibrillation at a rate of 66 bpm.  Normal intervals.  Normal axis.  No signs of acute ischemia.      Procedure  Procedures     Geoffrey Liu MD  11/17/24 2033

## 2024-11-17 NOTE — PROGRESS NOTES
Pharmacy Medication History Review   Tried to speak with the patient, not 100% reliable. Called pt granddaughter and pharmacy    Fela Raman is a 89 y.o. female admitted for Acute cholecystitis. Pharmacy reviewed the patient's [id number] medications and allergies for accuracy.    The list below reflectives the updated PTA list. Please review each medication in order reconciliation for additional clarification and justification.   The following updates were made to the Prior to Admission medication list:     Source of Information:     Medications ADDED:   All medications  Medications CHANGED:    Medications REMOVED:     Medications NOT TAKING:       Allergy reviewed : Yes    Meds 2 Beds : Yes    Comments:     Prior to Admission Medications   Prescriptions Last Dose Informant   amLODIPine (Norvasc) 10 mg tablet 11/16/2024 Morning    Sig: Take 1 tablet (10 mg) by mouth once daily.   aspirin 81 mg chewable tablet 11/16/2024 Morning    Sig: Chew 1 tablet (81 mg) once daily.   atorvastatin (Lipitor) 40 mg tablet 11/16/2024 Bedtime    Sig: Take 1 tablet (40 mg) by mouth once daily at bedtime.   furosemide (Lasix) 20 mg tablet 11/16/2024 Morning    Sig: Take 0.5 tablets (10 mg) by mouth once daily. Decreased dose   levETIRAcetam (Keppra) 500 mg tablet 11/16/2024 Bedtime    Sig: Take 1 tablet (500 mg) by mouth 2 times a day.   levothyroxine (Synthroid, Levoxyl) 25 mcg tablet 11/16/2024 Morning    Sig: Take 1 tablet (25 mcg) by mouth early in the morning.. Take on an empty stomach at the same time each day, either 30 to 60 minutes prior to breakfast   lisinopril 40 mg tablet 11/16/2024 Morning    Sig: Take 1 tablet (40 mg) by mouth once daily.      Facility-Administered Medications: None       The list below reflectives the updated allergy list. Please review each documented allergy for additional clarification and justification.  Allergies  Reviewed by Yoon Pettit on 11/17/2024        Severity Reactions  Comments    Shellfish Derived Not Specified Other             Below are additional concerns with the patient's PTA list.      Yoon Pettit

## 2024-11-17 NOTE — CONSULTS
Reason For Consult  Contained gallbladder perforation    History Of Present Illness  Fela Raman is a 89 y.o. female with extensive PMH of paroxysmal atrial fibrillation not on OAC due to a GI bleed on Xarelto, dementia, stroke in 2021, anemia, CKD 3, hypertension, hyperlipidemia cataracts and seizure disorder with a focus of seizures due to right frontal encephalomalacia that was brought in by EMS after her granddaughter found her endorsing constant abdominal pain.  Here in the ED the patient underwent a thorough workup that demonstrated CT scan findings consistent with Acute calculus cholecystitis with a contained gallbladder wall perforation.  Of note the patient is T. bili was 1.8 with minimal ALT/AST derangement but an alk phos of 228.  WBC within normal limits.  The patient also has a past medical history of a previous cardiac arrest during an attempted laparoscopic cholecystectomy in 2015.     The patient's granddaughter Drew Rojo provided a lot of the history, given the patient's poor mental status with her dementia.    General surgery consulted to evaluate.     Past Medical History  She has no past medical history on file.    Surgical History  She has a past surgical history that includes MR angio neck wo IV contrast (6/24/2022); MR angio head wo IV contrast (6/24/2022); and CT angio abdomen pelvis w and or wo IV IV contrast (9/22/2023).     Social History  She has no history on file for tobacco use, alcohol use, and drug use.    Family History  No family history on file.     Allergies  Shellfish derived    Review of Systems   Constitutional: Negative.          Physical Exam  Vitals reviewed.   Constitutional:       Comments: Pleasant, chronically ill-appearing   Cardiovascular:      Rate and Rhythm: Normal rate.   Pulmonary:      Effort: Pulmonary effort is normal.   Abdominal:      General: Abdomen is flat.      Palpations: Abdomen is soft.      Comments: Negative for right upper quadrant  "tenderness, negative Valdovinos sign.  No peritoneal signs   Skin:     General: Skin is warm.   Neurological:      Mental Status: Mental status is at baseline. She is disoriented.      Comments: AO x 2   Psychiatric:         Mood and Affect: Mood normal.              Last Recorded Vitals  Blood pressure 108/54, pulse 68, temperature 36.1 °C (97 °F), temperature source Temporal, resp. rate 17, height 1.473 m (4' 10\"), weight 65 kg (143 lb 4.8 oz), SpO2 98%.    Relevant Results  CT scan  IMPRESSION:  1. Findings concerning for acute calculus cholecystitis with  contained gallbladder wall perforations. Surgical evaluation is  recommended.  2. Common bile duct caliber is at the upper limit of normal for  patient age, 0.9 cm, which is slightly increased from 09/22/2023,  previously 0.8 cm. High-density focus within the distal common bile  duct could reflect sludge, stones, or artifact (Series 604, Image 58).  3. Small pericardial effusion which is new from 09/20/2023.  4. Coarse calcifications within the pancreatic parenchyma which can  be seen with chronic pancreatitis.  5. Colonic diverticulosis without evidence of diverticulitis.     Assessment/Plan   89 y.o. female with extensive PMH of paroxysmal atrial fibrillation not on OAC due to a GI bleed on Xarelto, dementia, stroke in 2021, anemia, CKD 3, hypertension, hyperlipidemia cataracts and seizure disorder with a focus of seizures due to right frontal encephalomalacia, with previous cardiac arrest during gallbladder surgery in 2015 this currently admitted for a contained gallbladder wall perforation.    The patient this morning denies any abdominal pain, and no tenderness is able to be elicited.  Extensive discussion was had with the patient's granddaughter, and the reality of the patient being a poor surgical candidate as well as DNR/DNI given her medical history.  At this point it would not be in the patient's benefit to be exposed to complex intricate procedure.  " Recommend IR consult for a possible cholecystostomy tube as a source control and continuing IV antibiotics.    Franco Dale MD (Vasquez)  General Surgeon  [facility] Endo Fellow        I spent 60 minutes in the professional and overall care of this patient.      Franco Levine MD

## 2024-11-17 NOTE — H&P
History Of Present Illness  Fela Raman is a 89 y.o. female with a past medical history of paroxysmal atrial fibrillation not on OAC due to a GI bleed on Xarelto, dementia, stroke in 2021, anemia, CKD 3, hypertension, hyperlipidemia cataracts and seizure disorder with a focus of seizures due to  right frontal encephalomalacia who presented to [facility] ER complaining of abdominal pain nausea and vomiting.  She is feeling somewhat better now setters symptoms are more severe earlier.  No mention of any fever chills chest pain or shortness of breath CT scan of the abdomen or pelvis shows an acute cholecystitis with a contained gallbladder wall perforation.     Past Medical History  Paroxysmal atrial fibrillation not on anticoagulation due to a GI bleed on Xarelto  Dementia  Stroke in November 2021  Chronic anemia  CKD 3  Hypertension  Hyperlipidemia  Seizure disorder  Right frontal encephalomalacia    Surgical History  Past Surgical History:   Procedure Laterality Date    CT ABDOMEN PELVIS ANGIOGRAM W AND/OR WO IV CONTRAST  9/22/2023    CT ABDOMEN PELVIS ANGIOGRAM W AND/OR WO IV CONTRAST 9/22/2023 [facility] CT    MR HEAD ANGIO WO IV CONTRAST  6/24/2022    MR HEAD ANGIO WO IV CONTRAST 6/24/2022 [facility]    MR NECK ANGIO WO IV CONTRAST  6/24/2022    MR NECK ANGIO WO IV CONTRAST 6/24/2022 [facility] CL[facility]ICAL [facility]         Social History  She has no history on file for tobacco use, alcohol use, and drug use.    Family History  No family history on file.     Allergies  Shellfish derived    Review of Systems   Constitutional:  Positive for activity change, appetite change and fatigue.   HENT: Negative.     Eyes: Negative.    Respiratory: Negative.     Cardiovascular: Negative.    Gastrointestinal:  Positive for abdominal pain, nausea and vomiting.   Endocrine: Negative.    Genitourinary: Negative.    Musculoskeletal: Negative.    Skin: Negative.    Allergic/Immunologic: Negative.    Neurological: Negative.   "  Hematological: Negative.    Psychiatric/Behavioral:  Positive for confusion.         Pleasantly confused.  No agitation   All other systems reviewed and are negative.       Physical Exam  Vitals and nursing note reviewed.   Constitutional:       Appearance: Normal appearance. She is ill-appearing.   HENT:      Head: Normocephalic.      Right Ear: External ear normal.      Left Ear: External ear normal.      Nose: Nose normal.      Mouth/Throat:      Mouth: Mucous membranes are dry.      Pharynx: Oropharynx is clear.   Eyes:      Extraocular Movements: Extraocular movements intact.      Conjunctiva/sclera: Conjunctivae normal.      Pupils: Pupils are equal, round, and reactive to light.   Cardiovascular:      Rate and Rhythm: Normal rate and regular rhythm.   Pulmonary:      Effort: Pulmonary effort is normal.      Breath sounds: Normal breath sounds.   Abdominal:      General: Abdomen is flat. Bowel sounds are normal.      Palpations: Abdomen is soft.      Tenderness: There is abdominal tenderness.   Musculoskeletal:         General: Normal range of motion.   Skin:     General: Skin is warm and dry.   Neurological:      General: No focal deficit present.      Mental Status: She is alert. Mental status is at baseline.   Psychiatric:         Mood and Affect: Mood normal.         Behavior: Behavior normal.          Last Recorded Vitals  Blood pressure 151/50, pulse 69, temperature 36.1 °C (97 °F), temperature source Temporal, resp. rate 17, height 1.473 m (4' 10\"), weight 65 kg (143 lb 4.8 oz), SpO2 97%.    Relevant Results  Meds:  Scheduled medications  heparin (porcine), 5,000 Units, subcutaneous, q8h  pantoprazole, 40 mg, oral, Daily before breakfast   Or  pantoprazole, 40 mg, intravenous, Daily before breakfast      Continuous medications  sodium chloride 0.9%, 100 mL/hr      PRN medications  PRN medications: acetaminophen **OR** acetaminophen **OR** acetaminophen, ondansetron **OR** ondansetron   No current " outpatient medications     Labs:  Results for orders placed or performed during the hospital encounter of 11/17/24 (from the past 24 hours)   Sars-CoV-2 RT PCR, Symptomatic   Result Value Ref Range    Coronavirus 2019, PCR Not Detected Not Detected   CBC and Auto Differential   Result Value Ref Range    WBC 7.9 4.4 - 11.3 x10*3/uL    nRBC 0.0 0.0 - 0.0 /100 WBCs    RBC 4.68 4.00 - 5.20 x10*6/uL    Hemoglobin 13.0 12.0 - 16.0 g/dL    Hematocrit 39.9 36.0 - 46.0 %    MCV 85 80 - 100 fL    MCH 27.8 26.0 - 34.0 pg    MCHC 32.6 32.0 - 36.0 g/dL    RDW 15.7 (H) 11.5 - 14.5 %    Platelets 294 150 - 450 x10*3/uL    Neutrophils % 85.4 40.0 - 80.0 %    Immature Granulocytes %, Automated 0.4 0.0 - 0.9 %    Lymphocytes % 8.1 13.0 - 44.0 %    Monocytes % 5.3 2.0 - 10.0 %    Eosinophils % 0.5 0.0 - 6.0 %    Basophils % 0.3 0.0 - 2.0 %    Neutrophils Absolute 6.71 (H) 1.60 - 5.50 x10*3/uL    Immature Granulocytes Absolute, Automated 0.03 0.00 - 0.50 x10*3/uL    Lymphocytes Absolute 0.64 (L) 0.80 - 3.00 x10*3/uL    Monocytes Absolute 0.42 0.05 - 0.80 x10*3/uL    Eosinophils Absolute 0.04 0.00 - 0.40 x10*3/uL    Basophils Absolute 0.02 0.00 - 0.10 x10*3/uL   Comprehensive Metabolic Panel   Result Value Ref Range    Glucose 122 (H) 74 - 99 mg/dL    Sodium 135 (L) 136 - 145 mmol/L    Potassium 3.8 3.5 - 5.3 mmol/L    Chloride 97 (L) 98 - 107 mmol/L    Bicarbonate 26 21 - 32 mmol/L    Anion Gap 16 10 - 20 mmol/L    Urea Nitrogen 11 6 - 23 mg/dL    Creatinine 1.04 0.50 - 1.05 mg/dL    eGFR 51 (L) >60 mL/min/1.73m*2    Calcium 9.1 8.6 - 10.3 mg/dL    Albumin 3.3 (L) 3.4 - 5.0 g/dL    Alkaline Phosphatase 228 (H) 33 - 136 U/L    Total Protein 7.9 6.4 - 8.2 g/dL     (H) 9 - 39 U/L    Bilirubin, Total 1.8 (H) 0.0 - 1.2 mg/dL    ALT 42 7 - 45 U/L   Magnesium   Result Value Ref Range    Magnesium 1.88 1.60 - 2.40 mg/dL   aPTT   Result Value Ref Range    aPTT 51 (H) 27 - 38 seconds   Protime-INR   Result Value Ref Range    Protime 12.7  9.8 - 12.8 seconds    INR 1.1 0.9 - 1.1   B-Type Natriuretic Peptide   Result Value Ref Range     (H) 0 - 99 pg/mL   Lipase   Result Value Ref Range    Lipase 42 9 - 82 U/L   Troponin I, High Sensitivity, Initial   Result Value Ref Range    Troponin I, High Sensitivity 48 (H) 0 - 13 ng/L   Troponin, High Sensitivity, 1 Hour   Result Value Ref Range    Troponin I, High Sensitivity 40 (H) 0 - 13 ng/L      Imaging:  CT abdomen pelvis w IV contrast    Result Date: 11/17/2024  Interpreted By:  Wilfrid Pop, STUDY: CT ABDOMEN PELVIS W IV CONTRAST;  11/17/2024 4:18 am   INDICATION: Signs/Symptoms:ruq pain.   COMPARISON: 09/22/2023   ACCESSION NUMBER(S): [id number]   ORDERING CLINICIAN: KARINA VELAZQUEZ   TECHNIQUE: Axial CT images of the abdomen and pelvis with coronal and sagittal reconstructed images obtained. Intravenous contrast was administered, 75 mL Omnipaque 350.   FINDINGS: LOWER CHEST: Small pericardial effusion which is new from 09/20/2023. Multi-vessel coronary atherosclerotic calcification. Prominent left atrial enlargement and dilation of the left atrial appendage. There is dependent subsegmental atelectasis. Slight elevation of the right hemidiaphragm.   LIVER: Periportal edema.   BILE DUCTS: Common bile duct caliber is at the upper limit of normal for patient age, 0.9 cm, which is slightly increased from 09/22/2023, previously 0.8 cm. High-density focus within the distal common bile duct could reflect sludge, stones, or artifact (Series 604, Image 58).   GALLBLADDER: Calcified gallstones within the gallbladder neck. There is diffuse gallbladder wall thickening, pericholecystic fat stranding, and pericholecystic fluid. There are focal regions of discontinuity of the gallbladder wall concerning for contained perforation (Series 604, Image 59).   PANCREAS: There is mild diffuse fatty atrophy. Coarse calcifications within the pancreatic parenchyma which can be seen with chronic pancreatitis.    SPLEEN: Within normal limits.   ADRENALS: Within normal limits.   KIDNEYS, URETERS, and BLADDER: No hydronephrosis or renal calculi. Ureters are non-dilated.  Urinary bladder within normal limits.   REPRODUCTIVE: Hysterectomy. No adnexal mass.   VESSELS: Moderate atherosclerosis. No abdominal aortic aneurysm. Borderline ectatic left common iliac artery measuring 1.6 cm, unchanged from 09/22/2023.   RETROPERITONEUM and LYMPH NODES: No lymphadenopathy.   BOWEL: The stomach is unremarkable. Small bowel is non-dilated. Normal appendix. Colon is interposed between the right hemidiaphragm and liver. Colonic diverticulosis without evidence of diverticulitis.   PERITONEUM: Trace pericholecystic fluid as above. No free air.   BODY WALL: Fat containing periumbilical hernia with associated calcifications and soft tissue densities, unchanged from 09/20/2023.   MUSCULOSKELETAL: No acute osseous abnormality or suspicious osseous lesions. Moderate multilevel spinal degenerative change.       1. Findings concerning for acute calculus cholecystitis with contained gallbladder wall perforations. Surgical evaluation is recommended. 2. Common bile duct caliber is at the upper limit of normal for patient age, 0.9 cm, which is slightly increased from 09/22/2023, previously 0.8 cm. High-density focus within the distal common bile duct could reflect sludge, stones, or artifact (Series 604, Image 58). 3. Small pericardial effusion which is new from 09/20/2023. 4. Coarse calcifications within the pancreatic parenchyma which can be seen with chronic pancreatitis. 5. Colonic diverticulosis without evidence of diverticulitis.   MACRO: Wilfrid Pop discussed the significance and urgency of this critical finding by telephone with  KARINA VELAZQUEZ on 11/17/2024 at 4:30 am.  (**-RCF-**) Findings:  See findings.   Signed by: Wilfrid Pop 11/17/2024 4:34 AM Dictation workstation:   [id number]     right upper quadrant    Result Date:  11/17/2024  Interpreted By:  Rakan Fitzgerald, STUDY: US RIGHT UPPER QUADRANT;  11/17/2024 3:25 am   INDICATION: Signs/Symptoms:ruq pain.   COMPARISON: CT abdomen and pelvis 09/22/2023   ACCESSION NUMBER(S): [id number]   ORDERING CLINICIAN: KARINA VELAZQUEZ   TECHNIQUE: Grayscale and color Doppler sonographic imaging of the right upper quadrant.   FINDINGS: Very limited exam due to patient conditions including bowel-gas, history of dementia, and difficulty with repositioning.   LIVER: Normal size. Normal echogenicity, and echotexture. No focal abnormalities.   BILE DUCTS: Common bile duct is not visualized.   GALLBLADDER: Large gallstone, similar to prior CT. Gallbladder wall thickness of 0.4 cm, borderline thickened. Sonographic Valdovinos's sign is negative.   PANCREAS: Obscured by bowel gas.   RIGHT KIDNEY: Obscured by bowel gas.   PERITONEUM: No upper abdominal ascites.       Very limited exam as discussed above. Within these limitations:   There is a large gallstone, similar to prior CT from 09/22/2023. There is mild gallbladder wall thickening but sonographic Valdovinos's sign is negative, therefore findings are equivocal for acute cholecystitis. If there is clinical concern, recommend HIDA scan to clarify.   MACRO: None   Signed by: Rakan Fitzgerald 11/17/2024 3:30 AM Dictation workstation:   [id number]    XR chest 1 view    Result Date: 11/17/2024  Interpreted By:  Rakan Fitzgerald, STUDY: XR CHEST 1 VIEW;  11/17/2024 1:58 am   INDICATION: Signs/Symptoms:Chest Pain.   COMPARISON: CXR 06/23/2022   ACCESSION NUMBER(S): [id number]   ORDERING CLINICIAN: KARINA VELAZQUEZ   FINDINGS: Lungs are clear.   No pleural effusion or pneumothorax.   Atherosclerotic calcification of the aortic arch. Cardiomegaly, unchanged compared to prior   Upper abdomen is unremarkable.   High-riding humeral head suggesting rotator cuff tears. No acute osseous abnormalities.       1. No acute cardiopulmonary process. 2. Cardiomegaly with  atherosclerotic calcification of the aortic arch, similar to prior.   MACRO: None   Signed by: Rakan Fitzgerald 11/17/2024 2:01 AM Dictation workstation:   [id number]      Assessment/Plan   Acute acalculous cholecystitis with contained gallbladder wall perforation  Plan:  IV hydration  Zosyn  General Surgery consultation  Pain and nausea control    Transaminitis, likely secondary to calm wild dog stone  Hold atorvastatin for now    Paroxysmal atrial fibrillation  Not on OAC due to GI bleeding with Xarelto    DNR CCA    Hypertension  Lisinopril 40 mg orally daily  Amlodipine 10 mg orally daily  Furosemide 20 mg orally daily, will hold for now as patient is n.p.o.    Seizure disorder  Patient is on Keppra 500 mg p.o. twice daily will give her IV for now  Hyperlipidemia  She is on atorvastatin 40 mg orally daily will hold for now due to transaminitis    CKD 3  Trend creatinine    History of stroke in 2021  On aspirin and statin which being held right now due to transaminitis    DVT prophylaxis  Heparin 5000 units subcu every 8 hours  SCDs\    I spent 60 minutes in the professional and overall care of this patient.      Jerry Simmons DO

## 2024-11-17 NOTE — ED TRIAGE NOTES
Pt BIBA from home c/o abdominal pain and n/v. Pt denies cp and sob. Pt has hx of A.fibb, HTN, Stroke no deficit as per daughter, seizure and early onset of dementia. Pt is Aox3 and ambulates with a roller.

## 2024-11-17 NOTE — NURSING NOTE
Pt had no output since arriving from the ED. Bladder scan was 325 ml. New order to straight cath X1, 300 ml emptied via straight cath, will continue to monitor urine output.

## 2024-11-18 ENCOUNTER — APPOINTMENT (OUTPATIENT)
Dept: CARDIOLOGY | Facility: HOSPITAL | Age: 89
DRG: 446 | End: 2024-11-18
Payer: MEDICARE

## 2024-11-18 LAB
ALBUMIN SERPL BCP-MCNC: 2.8 G/DL (ref 3.4–5)
ALP SERPL-CCNC: 199 U/L (ref 33–136)
ALT SERPL W P-5'-P-CCNC: 52 U/L (ref 7–45)
ANION GAP SERPL CALC-SCNC: 14 MMOL/L (ref 10–20)
AST SERPL W P-5'-P-CCNC: 126 U/L (ref 9–39)
ATRIAL RATE: 60 BPM
BILIRUB SERPL-MCNC: 1.2 MG/DL (ref 0–1.2)
BUN SERPL-MCNC: 9 MG/DL (ref 6–23)
CALCIUM SERPL-MCNC: 8.3 MG/DL (ref 8.6–10.3)
CHLORIDE SERPL-SCNC: 104 MMOL/L (ref 98–107)
CO2 SERPL-SCNC: 21 MMOL/L (ref 21–32)
CREAT SERPL-MCNC: 1.06 MG/DL (ref 0.5–1.05)
EGFRCR SERPLBLD CKD-EPI 2021: 50 ML/MIN/1.73M*2
ERYTHROCYTE [DISTWIDTH] IN BLOOD BY AUTOMATED COUNT: 16.1 % (ref 11.5–14.5)
GLUCOSE SERPL-MCNC: 77 MG/DL (ref 74–99)
HCT VFR BLD AUTO: 40.2 % (ref 36–46)
HGB BLD-MCNC: 11.5 G/DL (ref 12–16)
MCH RBC QN AUTO: 27.1 PG (ref 26–34)
MCHC RBC AUTO-ENTMCNC: 28.6 G/DL (ref 32–36)
MCV RBC AUTO: 95 FL (ref 80–100)
NRBC BLD-RTO: 0 /100 WBCS (ref 0–0)
PLATELET # BLD AUTO: 209 X10*3/UL (ref 150–450)
POTASSIUM SERPL-SCNC: 3.5 MMOL/L (ref 3.5–5.3)
PROT SERPL-MCNC: 6.4 G/DL (ref 6.4–8.2)
Q ONSET: 216 MS
QRS COUNT: 11 BEATS
QRS DURATION: 114 MS
QT INTERVAL: 442 MS
QTC CALCULATION(BAZETT): 463 MS
QTC FREDERICIA: 456 MS
R AXIS: -8 DEGREES
RBC # BLD AUTO: 4.25 X10*6/UL (ref 4–5.2)
SODIUM SERPL-SCNC: 135 MMOL/L (ref 136–145)
T AXIS: 159 DEGREES
T OFFSET: 437 MS
VENTRICULAR RATE: 66 BPM
WBC # BLD AUTO: 4.9 X10*3/UL (ref 4.4–11.3)

## 2024-11-18 PROCEDURE — 2500000004 HC RX 250 GENERAL PHARMACY W/ HCPCS (ALT 636 FOR OP/ED): Performed by: INTERNAL MEDICINE

## 2024-11-18 PROCEDURE — 85027 COMPLETE CBC AUTOMATED: CPT | Performed by: INTERNAL MEDICINE

## 2024-11-18 PROCEDURE — 80053 COMPREHEN METABOLIC PANEL: CPT | Performed by: INTERNAL MEDICINE

## 2024-11-18 PROCEDURE — 9420000001 HC RT PATIENT EDUCATION 5 MIN

## 2024-11-18 PROCEDURE — 2500000001 HC RX 250 WO HCPCS SELF ADMINISTERED DRUGS (ALT 637 FOR MEDICARE OP): Performed by: INTERNAL MEDICINE

## 2024-11-18 PROCEDURE — 36415 COLL VENOUS BLD VENIPUNCTURE: CPT | Performed by: INTERNAL MEDICINE

## 2024-11-18 PROCEDURE — 93005 ELECTROCARDIOGRAM TRACING: CPT

## 2024-11-18 PROCEDURE — 99232 SBSQ HOSP IP/OBS MODERATE 35: CPT | Performed by: INTERNAL MEDICINE

## 2024-11-18 PROCEDURE — 99232 SBSQ HOSP IP/OBS MODERATE 35: CPT | Performed by: SURGERY

## 2024-11-18 PROCEDURE — 99221 1ST HOSP IP/OBS SF/LOW 40: CPT

## 2024-11-18 PROCEDURE — 1100000001 HC PRIVATE ROOM DAILY

## 2024-11-18 RX ADMIN — PIPERACILLIN SODIUM AND TAZOBACTAM SODIUM 3.38 G: 3; .375 INJECTION, SOLUTION INTRAVENOUS at 11:33

## 2024-11-18 RX ADMIN — ASPIRIN 81 MG 81 MG: 81 TABLET ORAL at 08:31

## 2024-11-18 RX ADMIN — HEPARIN SODIUM 5000 UNITS: 5000 INJECTION, SOLUTION INTRAVENOUS; SUBCUTANEOUS at 21:37

## 2024-11-18 RX ADMIN — PANTOPRAZOLE SODIUM 40 MG: 40 TABLET, DELAYED RELEASE ORAL at 08:31

## 2024-11-18 RX ADMIN — PIPERACILLIN SODIUM AND TAZOBACTAM SODIUM 3.38 G: 3; .375 INJECTION, SOLUTION INTRAVENOUS at 16:34

## 2024-11-18 RX ADMIN — LISINOPRIL 40 MG: 20 TABLET ORAL at 08:31

## 2024-11-18 RX ADMIN — PIPERACILLIN SODIUM AND TAZOBACTAM SODIUM 3.38 G: 3; .375 INJECTION, SOLUTION INTRAVENOUS at 05:11

## 2024-11-18 RX ADMIN — LEVETIRACETAM 500 MG: 5 INJECTION INTRAVENOUS at 21:36

## 2024-11-18 RX ADMIN — HEPARIN SODIUM 5000 UNITS: 5000 INJECTION, SOLUTION INTRAVENOUS; SUBCUTANEOUS at 13:14

## 2024-11-18 RX ADMIN — LEVETIRACETAM 500 MG: 5 INJECTION INTRAVENOUS at 08:34

## 2024-11-18 RX ADMIN — PIPERACILLIN SODIUM AND TAZOBACTAM SODIUM 3.38 G: 3; .375 INJECTION, SOLUTION INTRAVENOUS at 22:54

## 2024-11-18 ASSESSMENT — COGNITIVE AND FUNCTIONAL STATUS - GENERAL
PERSONAL GROOMING: A LITTLE
CLIMB 3 TO 5 STEPS WITH RAILING: A LOT
HELP NEEDED FOR BATHING: A LOT
DRESSING REGULAR UPPER BODY CLOTHING: A LOT
DRESSING REGULAR LOWER BODY CLOTHING: A LOT
MOBILITY SCORE: 16
CLIMB 3 TO 5 STEPS WITH RAILING: A LOT
MOVING TO AND FROM BED TO CHAIR: A LITTLE
DRESSING REGULAR LOWER BODY CLOTHING: A LOT
WALKING IN HOSPITAL ROOM: A LOT
MOBILITY SCORE: 16
CLIMB 3 TO 5 STEPS WITH RAILING: A LOT
DRESSING REGULAR LOWER BODY CLOTHING: A LOT
DAILY ACTIVITIY SCORE: 14
STANDING UP FROM CHAIR USING ARMS: A LITTLE
HELP NEEDED FOR BATHING: A LOT
STANDING UP FROM CHAIR USING ARMS: A LITTLE
DRESSING REGULAR UPPER BODY CLOTHING: A LOT
EATING MEALS: A LITTLE
MOBILITY SCORE: 16
DAILY ACTIVITIY SCORE: 14
DRESSING REGULAR UPPER BODY CLOTHING: A LOT
EATING MEALS: A LITTLE
DAILY ACTIVITIY SCORE: 14
PERSONAL GROOMING: A LITTLE
STANDING UP FROM CHAIR USING ARMS: A LITTLE
MOBILITY SCORE: 15
MOVING TO AND FROM BED TO CHAIR: A LITTLE
DRESSING REGULAR LOWER BODY CLOTHING: A LOT
HELP NEEDED FOR BATHING: A LOT
EATING MEALS: A LITTLE
MOBILITY SCORE: 16
PERSONAL GROOMING: A LITTLE
STANDING UP FROM CHAIR USING ARMS: A LITTLE
PERSONAL GROOMING: A LITTLE
MOVING TO AND FROM BED TO CHAIR: A LITTLE
HELP NEEDED FOR BATHING: A LITTLE
PERSONAL GROOMING: A LITTLE
DAILY ACTIVITIY SCORE: 14
MOVING TO AND FROM BED TO CHAIR: A LITTLE
WALKING IN HOSPITAL ROOM: A LOT
DRESSING REGULAR LOWER BODY CLOTHING: A LOT
CLIMB 3 TO 5 STEPS WITH RAILING: A LOT
DRESSING REGULAR LOWER BODY CLOTHING: A LOT
CLIMB 3 TO 5 STEPS WITH RAILING: TOTAL
PERSONAL GROOMING: A LITTLE
MOVING FROM LYING ON BACK TO SITTING ON SIDE OF FLAT BED WITH BEDRAILS: A LITTLE
CLIMB 3 TO 5 STEPS WITH RAILING: A LOT
DRESSING REGULAR UPPER BODY CLOTHING: A LOT
TOILETING: A LOT
DRESSING REGULAR LOWER BODY CLOTHING: A LITTLE
TOILETING: A LITTLE
DAILY ACTIVITIY SCORE: 14
DRESSING REGULAR UPPER BODY CLOTHING: A LOT
PERSONAL GROOMING: A LITTLE
MOVING FROM LYING ON BACK TO SITTING ON SIDE OF FLAT BED WITH BEDRAILS: A LITTLE
MOBILITY SCORE: 16
DAILY ACTIVITIY SCORE: 19
WALKING IN HOSPITAL ROOM: A LOT
MOVING FROM LYING ON BACK TO SITTING ON SIDE OF FLAT BED WITH BEDRAILS: A LITTLE
DRESSING REGULAR UPPER BODY CLOTHING: A LITTLE
WALKING IN HOSPITAL ROOM: A LOT
MOVING TO AND FROM BED TO CHAIR: A LITTLE
MOVING FROM LYING ON BACK TO SITTING ON SIDE OF FLAT BED WITH BEDRAILS: A LITTLE
TURNING FROM BACK TO SIDE WHILE IN FLAT BAD: A LITTLE
WALKING IN HOSPITAL ROOM: A LOT
TURNING FROM BACK TO SIDE WHILE IN FLAT BAD: A LITTLE
TOILETING: A LOT
TURNING FROM BACK TO SIDE WHILE IN FLAT BAD: A LITTLE
TOILETING: A LOT
STANDING UP FROM CHAIR USING ARMS: A LITTLE
MOVING FROM LYING ON BACK TO SITTING ON SIDE OF FLAT BED WITH BEDRAILS: A LITTLE
HELP NEEDED FOR BATHING: A LOT
EATING MEALS: A LITTLE
HELP NEEDED FOR BATHING: A LOT
TOILETING: A LOT
DAILY ACTIVITIY SCORE: 14
TOILETING: A LOT
CLIMB 3 TO 5 STEPS WITH RAILING: A LOT
MOVING TO AND FROM BED TO CHAIR: A LITTLE
TOILETING: A LOT
STANDING UP FROM CHAIR USING ARMS: A LITTLE
WALKING IN HOSPITAL ROOM: A LOT
HELP NEEDED FOR BATHING: A LOT
MOBILITY SCORE: 16
WALKING IN HOSPITAL ROOM: A LOT
TURNING FROM BACK TO SIDE WHILE IN FLAT BAD: A LITTLE
TURNING FROM BACK TO SIDE WHILE IN FLAT BAD: A LITTLE
DAILY ACTIVITIY SCORE: 14
TOILETING: A LOT
TURNING FROM BACK TO SIDE WHILE IN FLAT BAD: A LITTLE
TURNING FROM BACK TO SIDE WHILE IN FLAT BAD: A LITTLE
PERSONAL GROOMING: A LITTLE
CLIMB 3 TO 5 STEPS WITH RAILING: A LOT
DRESSING REGULAR LOWER BODY CLOTHING: A LOT
WALKING IN HOSPITAL ROOM: A LOT
MOVING FROM LYING ON BACK TO SITTING ON SIDE OF FLAT BED WITH BEDRAILS: A LITTLE
STANDING UP FROM CHAIR USING ARMS: A LITTLE
MOBILITY SCORE: 16
MOVING TO AND FROM BED TO CHAIR: A LITTLE
MOVING FROM LYING ON BACK TO SITTING ON SIDE OF FLAT BED WITH BEDRAILS: A LITTLE
MOVING TO AND FROM BED TO CHAIR: A LITTLE
EATING MEALS: A LITTLE
EATING MEALS: A LITTLE
DRESSING REGULAR UPPER BODY CLOTHING: A LOT
HELP NEEDED FOR BATHING: A LOT
DRESSING REGULAR UPPER BODY CLOTHING: A LOT
STANDING UP FROM CHAIR USING ARMS: A LITTLE
TURNING FROM BACK TO SIDE WHILE IN FLAT BAD: A LITTLE
EATING MEALS: A LITTLE
MOVING FROM LYING ON BACK TO SITTING ON SIDE OF FLAT BED WITH BEDRAILS: A LITTLE

## 2024-11-18 ASSESSMENT — PAIN - FUNCTIONAL ASSESSMENT
PAIN_FUNCTIONAL_ASSESSMENT: 0-10

## 2024-11-18 ASSESSMENT — PAIN SCALES - GENERAL
PAINLEVEL_OUTOF10: 0 - NO PAIN

## 2024-11-18 NOTE — CARE PLAN
The patient's goals for the shift include      The clinical goals for the shift include pain control    Over the shift, the patient did not make progress toward the following goals.   Problem: Pain - Adult  Goal: Verbalizes/displays adequate comfort level or baseline comfort level  Outcome: Progressing     Problem: Safety - Adult  Goal: Free from fall injury  Outcome: Progressing     Problem: Discharge Planning  Goal: Discharge to home or other facility with appropriate resources  Outcome: Progressing     Problem: Chronic Conditions and Co-morbidities  Goal: Patient's chronic conditions and co-morbidity symptoms are monitored and maintained or improved  Outcome: Progressing

## 2024-11-18 NOTE — CONSULTS
Consults  Reason for Consult:  Evaluation management of acute cholecystitis with gallbladder wall perforation    Patient is seen at the request of Dr. Ian Ely    Subjective   History of Present Illness:  Fela Raman is a 89 y.o. female who presented on 11/16/2024 with abdominal pain associated with nausea and vomiting.  On arrival she had a temperature of 36.1 with a white blood count of 7.9 and a creatinine 1.0.  She had a total bilirubin of 1.8 and a mild transaminitis.  Her COVID-19 test was negative.  Her chest x-ray showed no acute issues.  She had a right upper quadrant ultrasound that showed a large gallstone with mild gallbladder wall thickening.  This was followed by a CT scan of the abdomen pelvis that showed acute cholecystitis with gallbladder wall perforations.  She was seen by general surgery and medical management has been recommended.  She is receiving Zosyn.  She has no complaints right now.  She has eaten some lunch and denies any abdominal pain, nausea, or vomiting    Past Medical History:   has no past medical history on file.    has a past surgical history that includes MR angio neck wo IV contrast (6/24/2022); MR angio head wo IV contrast (6/24/2022); and CT angio abdomen pelvis w and or wo IV IV contrast (9/22/2023).     Social History:   reports that she has never smoked. She has never used smokeless tobacco. No history on file for alcohol use and drug use.     She lives with her granddaughter    Family History:  No sick contacts    Review of Systems:  Abdominal pain, nausea, and vomiting    Allergies:  Shellfish derived      Objective   Current Facility-Administered Medications   Medication Dose Route Frequency Provider Last Rate Last Admin    acetaminophen (Tylenol) tablet 650 mg  650 mg oral q4h PRN Jerry G Salomone, DO        Or    acetaminophen (Tylenol) oral liquid 650 mg  650 mg oral q4h PRN Jerry G Salomone, DO        Or    acetaminophen (Tylenol) suppository 650 mg  650 mg  rectal q4h PRN Jerry G Salomone, DO        [Held by provider] amLODIPine (Norvasc) tablet 10 mg  10 mg oral Daily Jerry G Salomone, DO        aspirin chewable tablet 81 mg  81 mg oral Daily Jerry G Salomone, DO   81 mg at 11/18/24 0831    [Held by provider] atorvastatin (Lipitor) tablet 40 mg  40 mg oral Nightly Jerry G Salomone, DO        heparin (porcine) injection 5,000 Units  5,000 Units subcutaneous q8h Jerry  Salomone, DO   5,000 Units at 11/18/24 1314    HYDROmorphone (Dilaudid) injection 0.4 mg  0.4 mg intravenous q4h PRN Jerry G Salomone, DO        HYDROmorphone PF (Dilaudid) injection 0.2 mg  0.2 mg intravenous q4h PRN Jeryr G Salomone, DO        levETIRAcetam (Keppra) 500 mg in sodium chloride (iso)  mL  500 mg intravenous q12h Jerry KARUNA Tenorioe, DO   Stopped at 11/18/24 0927    lisinopril tablet 40 mg  40 mg oral Daily Jerry KARUNA Sageomone, DO   40 mg at 11/18/24 0831    ondansetron (Zofran) tablet 4 mg  4 mg oral q8h PRN Jerry KARUNA Salomone, DO        Or    ondansetron (Zofran) injection 4 mg  4 mg intravenous q8h PRN Jerry  Salomone, DO        pantoprazole (ProtoNix) EC tablet 40 mg  40 mg oral Daily before breakfast Jerry KARUNA Sageomone, DO   40 mg at 11/18/24 0831    Or    pantoprazole (ProtoNix) injection 40 mg  40 mg intravenous Daily before breakfast Jerry KARUNA Sageomone, DO   40 mg at 11/17/24 0627    piperacillin-tazobactam (Zosyn) 3.375 g in dextrose (iso) IV 50 mL  3.375 g intravenous q6h Jerry KARUNA Sageomone, DO   Stopped at 11/18/24 1234       Physical Exam:  /76   Pulse (!) 47   Temp 36.6 °C (97.8 °F)   Resp 17   Wt 65 kg (143 lb 4.8 oz)   SpO2 96%    General: no acute distress, lying in bed, sleeping and arousable, answers questions  HEENT: pink pharynx  CVS: RRR  Resp: decreased breath sounds in bases  ABD: soft, NT, ND  EXT: no edema  Skin: no rash     Relevant Results:    Labs:  Results from last 72 hours   Lab Units 11/18/24  0551 11/17/24  0246   SODIUM mmol/L 135* 135*   POTASSIUM mmol/L 3.5 3.8    CHLORIDE mmol/L 104 97*   BUN mg/dL 9 11   CREATININE mg/dL 1.06* 1.04   MAGNESIUM mg/dL  --  1.88     Results from last 72 hours   Lab Units 11/18/24  0551 11/17/24  0246   WBC AUTO x10*3/uL 4.9 7.9   HEMOGLOBIN g/dL 11.5* 13.0   HEMATOCRIT % 40.2 39.9   PLATELETS AUTO x10*3/uL 209 294       Microbiology data: I have personally and independently reviewed and intrepreted the lab results  No new findings    Imaging data: I have personally and independently reviewed and interpreted the imaging studies  11/17/2024 chest x-ray showed no acute issues  11/17/2024 right upper quadrant ultrasound showed a large gallstone with mild gallbladder wall thickening  11/17/2024 CT scan of the abdomen pelvis showed:  1. Findings concerning for acute calculus cholecystitis with  contained gallbladder wall perforations. Surgical evaluation is  recommended.  2. Common bile duct caliber is at the upper limit of normal for  patient age, 0.9 cm, which is slightly increased from 09/22/2023,  previously 0.8 cm. High-density focus within the distal common bile  duct could reflect sludge, stones, or artifact (Series 604, Image 58).  3. Small pericardial effusion which is new from 09/20/2023.  4. Coarse calcifications within the pancreatic parenchyma which can  be seen with chronic pancreatitis.  5. Colonic diverticulosis without evidence of diverticulitis.       Assessment/Plan     MY IMPRESSION & RECOMMENDATIONS:  Acute calculus cholecystitis with concern for gallbladder wall perforation. I have personally and independently reviewed and interpreted the laboratory tests, imaging studies, and the documentation from other healthcare providers.  I would recommend that we continue her on Zosyn for now.  I will monitor for side effects from Zosyn which can include rash, diarrhea, and bone marrow suppression.  She has been seen by general surgery who recommends medical management.  A cholecystotomy tube has been considered but will not be  pursued.  Her abdominal pain and vomiting have subsided.  Her transaminitis is being monitored.  When she is ready for discharge, she can go on oral treatment such as Augmentin.    -Continue Zosyn while hospitalized  -Can plan to discharge when ready on oral Augmentin 875 mg twice daily for approximately 10 days    Other issues:  #Paroxysmal atrial fibrillation  #Hypertension  #Hyperlipidemia  #Chronic kidney disease stage IIIa, which can affect the dosing of antimicrobials  #Seizure disorder   #History of CVA        Donald Guo MD

## 2024-11-18 NOTE — CONSULTS
Consults    Reason For Consult  Consulted by Dr. Ely for a percutaneous cholecystostomy tube placement.     History Of Present Illness  Fela Raman is a 89 y.o. female presenting with a past medical history of Afib (previously on Xeralto but discontinued d/t GI bleed), dementia, HTN, HLD, seizures (on keppra), CVA 2021 (on aspirin 81 mg) and hypothyroidism. Presented to [facility] at the direction of her daughter for abdominal pain. In the ED, she had a CT which I personally reviewed which was significant for acute calculous cholecystitis with contained GB wall perforation.     Patient has a hx of dementia and is pleasantly confused this morning. Unable to contribute to the HPI.      Past Medical History  She has no past medical history on file.    Surgical History  She has a past surgical history that includes MR angio neck wo IV contrast (6/24/2022); MR angio head wo IV contrast (6/24/2022); and CT angio abdomen pelvis w and or wo IV IV contrast (9/22/2023).     Social History  She reports that she has never smoked. She has never used smokeless tobacco. No history on file for alcohol use and drug use.    Family History  No family history on file.     Allergies  Shellfish derived    MEDS:    Current Facility-Administered Medications:     acetaminophen (Tylenol) tablet 650 mg, 650 mg, oral, q4h PRN **OR** acetaminophen (Tylenol) oral liquid 650 mg, 650 mg, oral, q4h PRN **OR** acetaminophen (Tylenol) suppository 650 mg, 650 mg, rectal, q4h PRN, Jerry G Salomone, DO    [Held by provider] amLODIPine (Norvasc) tablet 10 mg, 10 mg, oral, Daily, Jerry G Salomone, DO    aspirin chewable tablet 81 mg, 81 mg, oral, Daily, Jerry G Salomone, DO, 81 mg at 11/18/24 0831    [Held by provider] atorvastatin (Lipitor) tablet 40 mg, 40 mg, oral, Nightly, Jerry G Salomone, DO    heparin (porcine) injection 5,000 Units, 5,000 Units, subcutaneous, q8h, Jerry G Salomone, DO, 5,000 Units at 11/17/24 2138    HYDROmorphone (Dilaudid) injection  0.4 mg, 0.4 mg, intravenous, q4h PRN, Jerry G Salomone, DO    HYDROmorphone PF (Dilaudid) injection 0.2 mg, 0.2 mg, intravenous, q4h PRN, Jerry G Salomone, DO    levETIRAcetam (Keppra) 500 mg in sodium chloride (iso)  mL, 500 mg, intravenous, q12h, Jerry BECKMAN Salomone, DO, Last Rate: 400 mL/hr at 11/18/24 0834, 500 mg at 11/18/24 0834    lisinopril tablet 40 mg, 40 mg, oral, Daily, Jerry G Salomone, DO, 40 mg at 11/18/24 0831    ondansetron (Zofran) tablet 4 mg, 4 mg, oral, q8h PRN **OR** ondansetron (Zofran) injection 4 mg, 4 mg, intravenous, q8h PRN, Jerry BECKMAN Salomone, DO    pantoprazole (ProtoNix) EC tablet 40 mg, 40 mg, oral, Daily before breakfast, 40 mg at 11/18/24 0831 **OR** pantoprazole (ProtoNix) injection 40 mg, 40 mg, intravenous, Daily before breakfast, Jerry G Salomone, DO, 40 mg at 11/17/24 0627    piperacillin-tazobactam (Zosyn) 3.375 g in dextrose (iso) IV 50 mL, 3.375 g, intravenous, q6h, Jerry BECKMAN Salomone, DO, Stopped at 11/18/24 0550    Review of Systems  History obtained from chart review     Last Recorded Vitals  /64   Pulse 56   Temp 36.6 °C (97.9 °F)   Resp 17   Wt 65 kg (143 lb 4.8 oz)   SpO2 97%      Physical Exam  Orientation: Confused Alert and oriented to self  HEENT: normocephalic, atraumatic  Pulm: clear to auscultation bilaterally, no wheezes, good air entry  Cardiac: Regular rate and rhythm or without murmur or extra heart sounds  GI: soft, nontender, normal bowel sounds  Pulses: peripheral pulses symmetrical    Relevant Results    LABS:  Lab Results   Component Value Date    WBC 4.9 11/18/2024    HGB 11.5 (L) 11/18/2024    HCT 40.2 11/18/2024    MCV 95 11/18/2024     11/18/2024      Results from last 72 hours   Lab Units 11/18/24  0551   SODIUM mmol/L 135*   POTASSIUM mmol/L 3.5   CHLORIDE mmol/L 104   CO2 mmol/L 21   BUN mg/dL 9   CREATININE mg/dL 1.06*   GLUCOSE mg/dL 77   CALCIUM mg/dL 8.3*   ANION GAP mmol/L 14   EGFR mL/min/1.73m*2 50*     Results from last 72 hours    Lab Units 11/18/24  0551   ALK PHOS U/L 199*   BILIRUBIN TOTAL mg/dL 1.2   PROTEIN TOTAL g/dL 6.4   ALT U/L 52*   AST U/L 126*   ALBUMIN g/dL 2.8*     Results from last 72 hours   Lab Units 11/17/24  0246   INR  1.1       MICRO:  No results found for the last 14 days.      IMAGING:   CT abdomen pelvis w IV contrast   Final Result   1. Findings concerning for acute calculus cholecystitis with   contained gallbladder wall perforations. Surgical evaluation is   recommended.   2. Common bile duct caliber is at the upper limit of normal for   patient age, 0.9 cm, which is slightly increased from 09/22/2023,   previously 0.8 cm. High-density focus within the distal common bile   duct could reflect sludge, stones, or artifact (Series 604, Image 58).   3. Small pericardial effusion which is new from 09/20/2023.   4. Coarse calcifications within the pancreatic parenchyma which can   be seen with chronic pancreatitis.   5. Colonic diverticulosis without evidence of diverticulitis.        MACRO:   Wilfrid Pop discussed the significance and urgency of this   critical finding by telephone with  KARINA VELAZQUEZ on 11/17/2024 at   4:30 am.  (**-RCF-**) Findings:  See findings.        Signed by: Wilfrid Pop 11/17/2024 4:34 AM   Dictation workstation:   [id number]      US right upper quadrant   Final Result   Very limited exam as discussed above. Within these limitations:        There is a large gallstone, similar to prior CT from 09/22/2023.   There is mild gallbladder wall thickening but sonographic Valdovinos's   sign is negative, therefore findings are equivocal for acute   cholecystitis. If there is clinical concern, recommend HIDA scan to   clarify.        MACRO:   None        Signed by: Rakan Fitzgerald 11/17/2024 3:30 AM   Dictation workstation:   [id number]      XR chest 1 view   Final Result   1. No acute cardiopulmonary process.   2. Cardiomegaly with atherosclerotic calcification of the aortic   arch,  similar to prior.        MACRO:   None        Signed by: Rakan Fitzgerald 11/17/2024 2:01 AM   Dictation workstation:   [id number]           Assessment/Plan     This is a 89 y.o. female presenting with a past medical history of Afib (previously on Xeralto but discontinued d/t GI bleed), dementia, HTN, HLD, seizures (on keppra), CVA 2021 (on aspirin 81 mg) and hypothyroidism. Presented to [facility] at the direction of her daughter for abdominal pain. In the ED, she had a CT which I personally reviewed which was significant for acute calculous cholecystitis with contained GB wall perforation.     Patient is clinically stable, can not elicit any abdominal pain to palpation, negative tripathi's sign, WBC count is WNL. Patient is not a surgical candidate. She remains asymptomatic at this time. If she would require a percutaneous cholecystostomy, this could potentially be a life long drain. Recommend ID consult and conservative management. If she develops worsening s/s of acute cholecystitis, please consult IR to discuss drain placement.     Currently, no plan for IR drainage. Plan of care discussed with Dr. Hair, Dr. Guo and Nazario Ga PA-C, and Dr. Ely.         Elver Vines, APRN-CNP    Time : Billing Time  Prep time on date of the patient encounter: 15 minutes.   Time spent directly with patient/family/caregiver: 15 minutes.   Documentation time: 15 minutes.   Total time (minutes):  45 minutes  Time Spent with this Patient (minutes).  More than 50% of This Time was Spent in Counseling and/or Coordination of Care

## 2024-11-18 NOTE — PROGRESS NOTES
"Fela Raman is a 89 y.o. female on day 1 of admission presenting with Acute cholecystitis.    Assessment/Plan   Clinically improved. LFT down trending. WBC normal. Benign exam.  Can hold off on IR perc drain.  IV abx and transition to po soon for home. No plans for surgery    Subjective   No pain. No fevers. Feels well       Objective     Physical Exam  NAD  A&Ox3  Non icteric  CTA  RR  Abdomen soft non tender  Extremities warm, well perfused     Last Recorded Vitals  Blood pressure 169/64, pulse 56, temperature 36.6 °C (97.9 °F), resp. rate 17, height 1.473 m (4' 10\"), weight 65 kg (143 lb 4.8 oz), SpO2 97%.  Intake/Output last 3 Shifts:  I/O last 3 completed shifts:  In: 185 (2.8 mL/kg) [I.V.:35 (0.5 mL/kg); IV Piggyback:150]  Out: 300 (4.6 mL/kg) [Urine:300 (0.1 mL/kg/hr)]  Weight: 65 kg     Relevant Results    Scheduled medications  [Held by provider] amLODIPine, 10 mg, oral, Daily  aspirin, 81 mg, oral, Daily  [Held by provider] atorvastatin, 40 mg, oral, Nightly  heparin (porcine), 5,000 Units, subcutaneous, q8h  levETIRAcetam, 500 mg, intravenous, q12h  lisinopril, 40 mg, oral, Daily  pantoprazole, 40 mg, oral, Daily before breakfast   Or  pantoprazole, 40 mg, intravenous, Daily before breakfast  piperacillin-tazobactam, 3.375 g, intravenous, q6h      Continuous medications     PRN medications  PRN medications: acetaminophen **OR** acetaminophen **OR** acetaminophen, HYDROmorphone, HYDROmorphone, ondansetron **OR** ondansetron    Results for orders placed or performed during the hospital encounter of 11/17/24 (from the past 24 hours)   CBC   Result Value Ref Range    WBC 4.9 4.4 - 11.3 x10*3/uL    nRBC 0.0 0.0 - 0.0 /100 WBCs    RBC 4.25 4.00 - 5.20 x10*6/uL    Hemoglobin 11.5 (L) 12.0 - 16.0 g/dL    Hematocrit 40.2 36.0 - 46.0 %    MCV 95 80 - 100 fL    MCH 27.1 26.0 - 34.0 pg    MCHC 28.6 (L) 32.0 - 36.0 g/dL    RDW 16.1 (H) 11.5 - 14.5 %    Platelets 209 150 - 450 x10*3/uL   Comprehensive Metabolic " Panel   Result Value Ref Range    Glucose 77 74 - 99 mg/dL    Sodium 135 (L) 136 - 145 mmol/L    Potassium 3.5 3.5 - 5.3 mmol/L    Chloride 104 98 - 107 mmol/L    Bicarbonate 21 21 - 32 mmol/L    Anion Gap 14 10 - 20 mmol/L    Urea Nitrogen 9 6 - 23 mg/dL    Creatinine 1.06 (H) 0.50 - 1.05 mg/dL    eGFR 50 (L) >60 mL/min/1.73m*2    Calcium 8.3 (L) 8.6 - 10.3 mg/dL    Albumin 2.8 (L) 3.4 - 5.0 g/dL    Alkaline Phosphatase 199 (H) 33 - 136 U/L    Total Protein 6.4 6.4 - 8.2 g/dL     (H) 9 - 39 U/L    Bilirubin, Total 1.2 0.0 - 1.2 mg/dL    ALT 52 (H) 7 - 45 U/L           I spent 25 minutes in the professional and overall care of this patient.      Lake Guo MD

## 2024-11-18 NOTE — PROGRESS NOTES
"Fela Raman is a 89 y.o. female on day 1 of admission presenting with Acute cholecystitis.    Subjective   No acute events overnight. No significant pain.        Objective     VITALS  Blood pressure 169/64, pulse 56, temperature 36.6 °C (97.9 °F), resp. rate 17, height 1.473 m (4' 10\"), weight 65 kg (143 lb 4.8 oz), SpO2 97%.  Physical Exam  Vitals and nursing note reviewed.   Constitutional:       General: She is not in acute distress.     Appearance: Normal appearance.   HENT:      Head: Normocephalic and atraumatic.   Cardiovascular:      Rate and Rhythm: Normal rate and regular rhythm.      Heart sounds: Normal heart sounds.   Pulmonary:      Effort: Pulmonary effort is normal. No respiratory distress.      Breath sounds: Normal breath sounds. No wheezing.   Abdominal:      General: Abdomen is flat. Bowel sounds are normal. There is no distension.      Palpations: Abdomen is soft.      Tenderness: There is no abdominal tenderness.   Musculoskeletal:         General: No swelling or tenderness. Normal range of motion.   Skin:     General: Skin is warm and dry.      Capillary Refill: Capillary refill takes less than 2 seconds.   Neurological:      General: No focal deficit present.      Mental Status: She is alert. She is disoriented.   Psychiatric:         Mood and Affect: Mood normal.           Intake/Output last 3 Shifts:  I/O last 3 completed shifts:  In: 185 (2.8 mL/kg) [I.V.:35 (0.5 mL/kg); IV Piggyback:150]  Out: 300 (4.6 mL/kg) [Urine:300 (0.1 mL/kg/hr)]  Weight: 65 kg     Relevant Results  Results for orders placed or performed during the hospital encounter of 11/17/24 (from the past 24 hours)   CBC   Result Value Ref Range    WBC 4.9 4.4 - 11.3 x10*3/uL    nRBC 0.0 0.0 - 0.0 /100 WBCs    RBC 4.25 4.00 - 5.20 x10*6/uL    Hemoglobin 11.5 (L) 12.0 - 16.0 g/dL    Hematocrit 40.2 36.0 - 46.0 %    MCV 95 80 - 100 fL    MCH 27.1 26.0 - 34.0 pg    MCHC 28.6 (L) 32.0 - 36.0 g/dL    RDW 16.1 (H) 11.5 - 14.5 % "    Platelets 209 150 - 450 x10*3/uL   Comprehensive Metabolic Panel   Result Value Ref Range    Glucose 77 74 - 99 mg/dL    Sodium 135 (L) 136 - 145 mmol/L    Potassium 3.5 3.5 - 5.3 mmol/L    Chloride 104 98 - 107 mmol/L    Bicarbonate 21 21 - 32 mmol/L    Anion Gap 14 10 - 20 mmol/L    Urea Nitrogen 9 6 - 23 mg/dL    Creatinine 1.06 (H) 0.50 - 1.05 mg/dL    eGFR 50 (L) >60 mL/min/1.73m*2    Calcium 8.3 (L) 8.6 - 10.3 mg/dL    Albumin 2.8 (L) 3.4 - 5.0 g/dL    Alkaline Phosphatase 199 (H) 33 - 136 U/L    Total Protein 6.4 6.4 - 8.2 g/dL     (H) 9 - 39 U/L    Bilirubin, Total 1.2 0.0 - 1.2 mg/dL    ALT 52 (H) 7 - 45 U/L       Imaging Results  CT abdomen pelvis w IV contrast   Final Result   1. Findings concerning for acute calculus cholecystitis with   contained gallbladder wall perforations. Surgical evaluation is   recommended.   2. Common bile duct caliber is at the upper limit of normal for   patient age, 0.9 cm, which is slightly increased from 09/22/2023,   previously 0.8 cm. High-density focus within the distal common bile   duct could reflect sludge, stones, or artifact (Series 604, Image 58).   3. Small pericardial effusion which is new from 09/20/2023.   4. Coarse calcifications within the pancreatic parenchyma which can   be seen with chronic pancreatitis.   5. Colonic diverticulosis without evidence of diverticulitis.        MACRO:   Wilfrid Pop discussed the significance and urgency of this   critical finding by telephone with  KARINA VELAZQUEZ on 11/17/2024 at   4:30 am.  (**-RCF-**) Findings:  See findings.        Signed by: Wilfrid Pop 11/17/2024 4:34 AM   Dictation workstation:   [id number]      US right upper quadrant   Final Result   Very limited exam as discussed above. Within these limitations:        There is a large gallstone, similar to prior CT from 09/22/2023.   There is mild gallbladder wall thickening but sonographic Valdovinos's   sign is negative, therefore findings  are equivocal for acute   cholecystitis. If there is clinical concern, recommend HIDA scan to   clarify.        MACRO:   None        Signed by: Rakan Fitzgerald 11/17/2024 3:30 AM   Dictation workstation:   [id number]      XR chest 1 view   Final Result   1. No acute cardiopulmonary process.   2. Cardiomegaly with atherosclerotic calcification of the aortic   arch, similar to prior.        MACRO:   None        Signed by: Rakan Fitzgerald 11/17/2024 2:01 AM   Dictation workstation:   [id number]          Medications:  [Held by provider] amLODIPine, 10 mg, oral, Daily  aspirin, 81 mg, oral, Daily  [Held by provider] atorvastatin, 40 mg, oral, Nightly  heparin (porcine), 5,000 Units, subcutaneous, q8h  levETIRAcetam, 500 mg, intravenous, q12h  lisinopril, 40 mg, oral, Daily  pantoprazole, 40 mg, oral, Daily before breakfast   Or  pantoprazole, 40 mg, intravenous, Daily before breakfast  piperacillin-tazobactam, 3.375 g, intravenous, q6h       PRN medications: acetaminophen **OR** acetaminophen **OR** acetaminophen, HYDROmorphone, HYDROmorphone, ondansetron **OR** ondansetron        Assessment/Plan          Principal Problem:    Acute cholecystitis    Acute acalculous cholecystitis with contained gallbladder wall perforation  -IV hydration  -Zosyn  -General Surgery consultation, not a candidate for surgery  -Consult ID for abx course at [place] for conservative management.   -Pain and nausea control  -No need for perc miranda drain, likely improve with conservative management      Paroxysmal atrial fibrillation  -Not on OAC due to GI bleeding with Xarelto     DNR CCA     Hypertension  -Lisinopril 40 mg orally daily  -Amlodipine 10 mg orally daily  -Furosemide 20 mg orally daily     Seizure disorder  -Patient is on Keppra 500 mg p.o. twice daily will give her IV for now     CKD 3  -Trend creatinine       DVT Prophylaxis:  Heparin subq    Disposition:  Anticpate can DC once abx recs from ID     Ian Ely, [facility]  Medicine

## 2024-11-18 NOTE — CARE PLAN
The patient's goals for the shift include      The clinical goals for the shift include Patient will remain comfortable throughout the shift    Over the shift, the patient did not make progress toward the following goals. Barriers to progression include acute cholecystitis. Recommendations to address these barriers include pain medicine as needed.

## 2024-11-18 NOTE — PROGRESS NOTES
Fela Raman is a 89 y.o. female on day 1 of admission presenting with Acute cholecystitis.    Called and left voicemails for all contacts listed for patient as she is confused at this time. Awaiting return call to discuss DC planning with family. Per chart review, looks like patient lives with family, does have dementia and uses rollator to ambulate independently. Awaiting confirmation from family.    Plan: admitted with cholecystitis, labs improved per surgery note. Holding off on perc tube drain at this time. ID consulted for abx recommendations.   Disposition: Home with family  Barrier: ID recs, family discussion for DC  ADOD:  tomorrow    1500pm   Called granddaughter Drew again and left VM. Awaiting for return call.        11/18/24 1216   Discharge Planning   Living Arrangements Family members   Support Systems Family members   Assistance Needed rollator   Type of Residence Private residence   Who is requesting discharge planning? Provider   Expected Discharge Disposition Home   Does the patient need discharge transport arranged? Yes   RoundTrip coordination needed? Yes   Has discharge transport been arranged? No       Mireya Garcia RN

## 2024-11-19 ENCOUNTER — PHARMACY VISIT (OUTPATIENT)
Dept: PHARMACY | Facility: CLINIC | Age: 89
End: 2024-11-19
Payer: COMMERCIAL

## 2024-11-19 VITALS
SYSTOLIC BLOOD PRESSURE: 151 MMHG | BODY MASS INDEX: 30.08 KG/M2 | HEART RATE: 69 BPM | TEMPERATURE: 97.7 F | DIASTOLIC BLOOD PRESSURE: 79 MMHG | OXYGEN SATURATION: 95 % | HEIGHT: 58 IN | WEIGHT: 143.3 LBS | RESPIRATION RATE: 18 BRPM

## 2024-11-19 LAB
ALBUMIN SERPL BCP-MCNC: 2.3 G/DL (ref 3.4–5)
ALP SERPL-CCNC: 171 U/L (ref 33–136)
ALT SERPL W P-5'-P-CCNC: 40 U/L (ref 7–45)
ANION GAP SERPL CALC-SCNC: 13 MMOL/L (ref 10–20)
AST SERPL W P-5'-P-CCNC: 78 U/L (ref 9–39)
BILIRUB SERPL-MCNC: 0.9 MG/DL (ref 0–1.2)
BUN SERPL-MCNC: 9 MG/DL (ref 6–23)
CALCIUM SERPL-MCNC: 7.7 MG/DL (ref 8.6–10.3)
CHLORIDE SERPL-SCNC: 104 MMOL/L (ref 98–107)
CO2 SERPL-SCNC: 26 MMOL/L (ref 21–32)
CREAT SERPL-MCNC: 1.13 MG/DL (ref 0.5–1.05)
EGFRCR SERPLBLD CKD-EPI 2021: 47 ML/MIN/1.73M*2
ERYTHROCYTE [DISTWIDTH] IN BLOOD BY AUTOMATED COUNT: 15.9 % (ref 11.5–14.5)
GLUCOSE SERPL-MCNC: 77 MG/DL (ref 74–99)
HCT VFR BLD AUTO: 35.6 % (ref 36–46)
HGB BLD-MCNC: 11 G/DL (ref 12–16)
MCH RBC QN AUTO: 27.6 PG (ref 26–34)
MCHC RBC AUTO-ENTMCNC: 30.9 G/DL (ref 32–36)
MCV RBC AUTO: 89 FL (ref 80–100)
NRBC BLD-RTO: 0 /100 WBCS (ref 0–0)
PLATELET # BLD AUTO: 253 X10*3/UL (ref 150–450)
POTASSIUM SERPL-SCNC: 3.3 MMOL/L (ref 3.5–5.3)
PROT SERPL-MCNC: 5.3 G/DL (ref 6.4–8.2)
RBC # BLD AUTO: 3.98 X10*6/UL (ref 4–5.2)
SODIUM SERPL-SCNC: 140 MMOL/L (ref 136–145)
WBC # BLD AUTO: 3.9 X10*3/UL (ref 4.4–11.3)

## 2024-11-19 PROCEDURE — 2500000002 HC RX 250 W HCPCS SELF ADMINISTERED DRUGS (ALT 637 FOR MEDICARE OP, ALT 636 FOR OP/ED): Performed by: INTERNAL MEDICINE

## 2024-11-19 PROCEDURE — RXMED WILLOW AMBULATORY MEDICATION CHARGE

## 2024-11-19 PROCEDURE — 80053 COMPREHEN METABOLIC PANEL: CPT | Performed by: INTERNAL MEDICINE

## 2024-11-19 PROCEDURE — 2500000004 HC RX 250 GENERAL PHARMACY W/ HCPCS (ALT 636 FOR OP/ED): Performed by: INTERNAL MEDICINE

## 2024-11-19 PROCEDURE — 99239 HOSP IP/OBS DSCHRG MGMT >30: CPT | Performed by: INTERNAL MEDICINE

## 2024-11-19 PROCEDURE — 36415 COLL VENOUS BLD VENIPUNCTURE: CPT | Performed by: INTERNAL MEDICINE

## 2024-11-19 PROCEDURE — 85027 COMPLETE CBC AUTOMATED: CPT | Performed by: INTERNAL MEDICINE

## 2024-11-19 PROCEDURE — 2500000001 HC RX 250 WO HCPCS SELF ADMINISTERED DRUGS (ALT 637 FOR MEDICARE OP): Performed by: INTERNAL MEDICINE

## 2024-11-19 RX ORDER — POTASSIUM CHLORIDE 20 MEQ/1
40 TABLET, EXTENDED RELEASE ORAL ONCE
Status: COMPLETED | OUTPATIENT
Start: 2024-11-19 | End: 2024-11-19

## 2024-11-19 RX ORDER — AMOXICILLIN AND CLAVULANATE POTASSIUM 875; 125 MG/1; MG/1
1 TABLET, FILM COATED ORAL 2 TIMES DAILY
Qty: 20 TABLET | Refills: 0 | Status: SHIPPED | OUTPATIENT
Start: 2024-11-19 | End: 2024-11-29

## 2024-11-19 RX ADMIN — POTASSIUM CHLORIDE 40 MEQ: 1500 TABLET, EXTENDED RELEASE ORAL at 09:07

## 2024-11-19 RX ADMIN — ASPIRIN 81 MG 81 MG: 81 TABLET ORAL at 09:07

## 2024-11-19 RX ADMIN — PIPERACILLIN SODIUM AND TAZOBACTAM SODIUM 3.38 G: 3; .375 INJECTION, SOLUTION INTRAVENOUS at 04:37

## 2024-11-19 RX ADMIN — HEPARIN SODIUM 5000 UNITS: 5000 INJECTION, SOLUTION INTRAVENOUS; SUBCUTANEOUS at 04:36

## 2024-11-19 RX ADMIN — PIPERACILLIN SODIUM AND TAZOBACTAM SODIUM 3.38 G: 3; .375 INJECTION, SOLUTION INTRAVENOUS at 11:07

## 2024-11-19 RX ADMIN — HEPARIN SODIUM 5000 UNITS: 5000 INJECTION, SOLUTION INTRAVENOUS; SUBCUTANEOUS at 13:00

## 2024-11-19 RX ADMIN — LISINOPRIL 40 MG: 20 TABLET ORAL at 09:07

## 2024-11-19 RX ADMIN — LEVETIRACETAM 500 MG: 5 INJECTION INTRAVENOUS at 09:07

## 2024-11-19 RX ADMIN — PIPERACILLIN SODIUM AND TAZOBACTAM SODIUM 3.38 G: 3; .375 INJECTION, SOLUTION INTRAVENOUS at 15:55

## 2024-11-19 RX ADMIN — PANTOPRAZOLE SODIUM 40 MG: 40 INJECTION, POWDER, FOR SOLUTION INTRAVENOUS at 06:38

## 2024-11-19 ASSESSMENT — COGNITIVE AND FUNCTIONAL STATUS - GENERAL
DAILY ACTIVITIY SCORE: 18
WALKING IN HOSPITAL ROOM: A LITTLE
CLIMB 3 TO 5 STEPS WITH RAILING: A LITTLE
MOVING FROM LYING ON BACK TO SITTING ON SIDE OF FLAT BED WITH BEDRAILS: A LITTLE
STANDING UP FROM CHAIR USING ARMS: A LITTLE
DAILY ACTIVITIY SCORE: 18
DRESSING REGULAR UPPER BODY CLOTHING: A LITTLE
TOILETING: A LITTLE
EATING MEALS: A LITTLE
DRESSING REGULAR LOWER BODY CLOTHING: A LITTLE
MOVING TO AND FROM BED TO CHAIR: A LITTLE
WALKING IN HOSPITAL ROOM: A LITTLE
MOBILITY SCORE: 18
CLIMB 3 TO 5 STEPS WITH RAILING: A LITTLE
EATING MEALS: A LITTLE
TURNING FROM BACK TO SIDE WHILE IN FLAT BAD: A LITTLE
DRESSING REGULAR UPPER BODY CLOTHING: A LITTLE
PERSONAL GROOMING: A LITTLE
TURNING FROM BACK TO SIDE WHILE IN FLAT BAD: A LITTLE
STANDING UP FROM CHAIR USING ARMS: A LITTLE
PERSONAL GROOMING: A LITTLE
DRESSING REGULAR LOWER BODY CLOTHING: A LITTLE
TURNING FROM BACK TO SIDE WHILE IN FLAT BAD: A LITTLE
MOBILITY SCORE: 18
CLIMB 3 TO 5 STEPS WITH RAILING: A LITTLE
WALKING IN HOSPITAL ROOM: A LITTLE
PERSONAL GROOMING: A LITTLE
TURNING FROM BACK TO SIDE WHILE IN FLAT BAD: A LITTLE
HELP NEEDED FOR BATHING: A LITTLE
MOVING TO AND FROM BED TO CHAIR: A LITTLE
HELP NEEDED FOR BATHING: A LITTLE
TURNING FROM BACK TO SIDE WHILE IN FLAT BAD: A LITTLE
DAILY ACTIVITIY SCORE: 18
MOVING FROM LYING ON BACK TO SITTING ON SIDE OF FLAT BED WITH BEDRAILS: A LITTLE
CLIMB 3 TO 5 STEPS WITH RAILING: A LITTLE
HELP NEEDED FOR BATHING: A LITTLE
CLIMB 3 TO 5 STEPS WITH RAILING: A LITTLE
MOVING TO AND FROM BED TO CHAIR: A LITTLE
EATING MEALS: A LITTLE
HELP NEEDED FOR BATHING: A LITTLE
STANDING UP FROM CHAIR USING ARMS: A LITTLE
PERSONAL GROOMING: A LITTLE
MOBILITY SCORE: 18
MOBILITY SCORE: 18
STANDING UP FROM CHAIR USING ARMS: A LITTLE
DRESSING REGULAR LOWER BODY CLOTHING: A LITTLE
TOILETING: A LITTLE
PERSONAL GROOMING: A LITTLE
CLIMB 3 TO 5 STEPS WITH RAILING: A LITTLE
MOVING FROM LYING ON BACK TO SITTING ON SIDE OF FLAT BED WITH BEDRAILS: A LITTLE
WALKING IN HOSPITAL ROOM: A LITTLE
TOILETING: A LITTLE
MOBILITY SCORE: 18
DRESSING REGULAR UPPER BODY CLOTHING: A LITTLE
TURNING FROM BACK TO SIDE WHILE IN FLAT BAD: A LITTLE
MOVING TO AND FROM BED TO CHAIR: A LITTLE
HELP NEEDED FOR BATHING: A LITTLE
EATING MEALS: A LITTLE
DRESSING REGULAR UPPER BODY CLOTHING: A LITTLE
DRESSING REGULAR LOWER BODY CLOTHING: A LITTLE
MOVING TO AND FROM BED TO CHAIR: A LITTLE
DAILY ACTIVITIY SCORE: 18
DRESSING REGULAR LOWER BODY CLOTHING: A LITTLE
HELP NEEDED FOR BATHING: A LITTLE
MOVING FROM LYING ON BACK TO SITTING ON SIDE OF FLAT BED WITH BEDRAILS: A LITTLE
WALKING IN HOSPITAL ROOM: A LITTLE
MOVING TO AND FROM BED TO CHAIR: A LITTLE
STANDING UP FROM CHAIR USING ARMS: A LITTLE
WALKING IN HOSPITAL ROOM: A LITTLE
TOILETING: A LITTLE
DRESSING REGULAR LOWER BODY CLOTHING: A LITTLE
DRESSING REGULAR UPPER BODY CLOTHING: A LITTLE
DAILY ACTIVITIY SCORE: 18
EATING MEALS: A LITTLE
DRESSING REGULAR UPPER BODY CLOTHING: A LITTLE
EATING MEALS: A LITTLE
PERSONAL GROOMING: A LITTLE
STANDING UP FROM CHAIR USING ARMS: A LITTLE
MOVING FROM LYING ON BACK TO SITTING ON SIDE OF FLAT BED WITH BEDRAILS: A LITTLE
MOBILITY SCORE: 18
DAILY ACTIVITIY SCORE: 18
MOVING FROM LYING ON BACK TO SITTING ON SIDE OF FLAT BED WITH BEDRAILS: A LITTLE

## 2024-11-19 ASSESSMENT — PAIN SCALES - GENERAL
PAINLEVEL_OUTOF10: 0 - NO PAIN

## 2024-11-19 ASSESSMENT — PAIN - FUNCTIONAL ASSESSMENT
PAIN_FUNCTIONAL_ASSESSMENT: 0-10

## 2024-11-19 NOTE — CARE PLAN
The patient's goals for the shift include      The clinical goals for the shift include Patient will remain comfortable throughout the shift    Over the shift, the patient did make progress toward the goal.

## 2024-11-19 NOTE — PROGRESS NOTES
Fela Raman is a 89 y.o. female on day 2 of admission presenting with Acute cholecystitis.    Spoke with patient's granddaughter Drew over the phone. States she lives with her in a home, does have aides during the day as Drew works. PCP through [facility]. Granddaughter states she would like to speak with doctor prior to DC, sent message to inform him and he plans to call and speak with her. She will come pick patient up this afternoon between 4-5pm when she is off work.      11/19/24 5719   Discharge Planning   Expected Discharge Disposition Home       Mireya Garcia RN

## 2024-11-19 NOTE — PROGRESS NOTES
For follow-up of:  Acute cholecystitis    Subjective   Interval History:  She has no complaints this morning.  She ate breakfast.  She denies any abdominal pain       Objective     Current Facility-Administered Medications   Medication Dose Route Frequency Provider Last Rate Last Admin    acetaminophen (Tylenol) tablet 650 mg  650 mg oral q4h PRN Jerry G Salomone, DO        Or    acetaminophen (Tylenol) oral liquid 650 mg  650 mg oral q4h PRN Jerry G Salomone, DO        Or    acetaminophen (Tylenol) suppository 650 mg  650 mg rectal q4h PRN Jerry G Salomone, DO        [Held by provider] amLODIPine (Norvasc) tablet 10 mg  10 mg oral Daily Jerry G Salomone, DO        aspirin chewable tablet 81 mg  81 mg oral Daily Jerry G Salomone, DO   81 mg at 11/19/24 0907    [Held by provider] atorvastatin (Lipitor) tablet 40 mg  40 mg oral Nightly Jerry G Salomone, DO        heparin (porcine) injection 5,000 Units  5,000 Units subcutaneous q8h Jerry G Salomone, DO   5,000 Units at 11/19/24 0436    HYDROmorphone (Dilaudid) injection 0.4 mg  0.4 mg intravenous q4h PRN Jerry G Salomone, DO        HYDROmorphone PF (Dilaudid) injection 0.2 mg  0.2 mg intravenous q4h PRN Jerry G Salomone, DO        levETIRAcetam (Keppra) 500 mg in sodium chloride (iso)  mL  500 mg intravenous q12h Jerry G Salomone,  mL/hr at 11/19/24 0907 500 mg at 11/19/24 0907    lisinopril tablet 40 mg  40 mg oral Daily Jerry G Salomone, DO   40 mg at 11/19/24 0907    ondansetron (Zofran) tablet 4 mg  4 mg oral q8h PRN Jerry G Salomone, DO        Or    ondansetron (Zofran) injection 4 mg  4 mg intravenous q8h PRN Jerry G Salomone, DO        pantoprazole (ProtoNix) EC tablet 40 mg  40 mg oral Daily before breakfast Jerry G Salomone, DO   40 mg at 11/18/24 0831    Or    pantoprazole (ProtoNix) injection 40 mg  40 mg intravenous Daily before breakfast Jerry G Salomone, DO   40 mg at 11/19/24 0638    piperacillin-tazobactam (Zosyn) 3.375 g in dextrose (iso) IV 50 mL  3.375  g intravenous q6h Jerry Simmons DO   Stopped at 11/19/24 0602        Last Recorded Vitals  /64   Pulse 55   Temp 36.6 °C (97.8 °F) (Temporal)   Resp 18   Wt 65 kg (143 lb 4.8 oz)   SpO2 98%   General: no acute distress, lying in bed, awake, answers questions  HEENT: pharynx not examined  CVS: RRR  Resp: decreased breath sounds in bases  ABD: soft, NT, ND  EXT: no edema  Skin: no rash     Relevant Results:    Labs:   Results from last 72 hours   Lab Units 11/19/24  0532 11/18/24  0551 11/17/24  0246   SODIUM mmol/L 140 135* 135*   POTASSIUM mmol/L 3.3* 3.5 3.8   CHLORIDE mmol/L 104 104 97*   BUN mg/dL 9 9 11   CREATININE mg/dL 1.13* 1.06* 1.04   MAGNESIUM mg/dL  --   --  1.88     Results from last 72 hours   Lab Units 11/19/24  0532 11/18/24  0551 11/17/24  0246   WBC AUTO x10*3/uL 3.9* 4.9 7.9   HEMOGLOBIN g/dL 11.0* 11.5* 13.0   HEMATOCRIT % 35.6* 40.2 39.9   PLATELETS AUTO x10*3/uL 253 209 294       Microbiology data: I have personally and independently reviewed and intrepreted the lab results  No new findings    Imaging data: I have personally and independently reviewed and interpreted the imaging studies  11/17/2024 chest x-ray showed no acute issues  11/17/2024 right upper quadrant ultrasound showed a large gallstone with mild gallbladder wall thickening  11/17/2024 CT scan of the abdomen pelvis showed:  1. Findings concerning for acute calculus cholecystitis with  contained gallbladder wall perforations. Surgical evaluation is  recommended.  2. Common bile duct caliber is at the upper limit of normal for  patient age, 0.9 cm, which is slightly increased from 09/22/2023,  previously 0.8 cm. High-density focus within the distal common bile  duct could reflect sludge, stones, or artifact (Series 604, Image 58).  3. Small pericardial effusion which is new from 09/20/2023.  4. Coarse calcifications within the pancreatic parenchyma which can  be seen with chronic pancreatitis.  5. Colonic  diverticulosis without evidence of diverticulitis.       Assessment/Plan     MY IMPRESSION & RECOMMENDATIONS:  Acute cholecystitis with concern for gallbladder wall perforation, being treated with IV antibiotics. I have personally and independently reviewed and interpreted the laboratory tests, imaging studies, and the documentation from other healthcare providers.  I am monitoring for side effects from Zosyn as outlined in a previous note.  She seems to be recovering adequately without surgical intervention.  Her transaminitis and hyperbilirubinemia have essentially resolved.  When she is ready for discharge, she can go on Augmentin.     -Continue Zosyn while hospitalized  -Can plan to discharge when ready on oral Augmentin 875 mg twice daily for approximately 10 days     Other issues:  #Paroxysmal atrial fibrillation  #Hypertension  #Hyperlipidemia  #Chronic kidney disease stage IIIa, which can affect the dosing of antimicrobials  #Seizure disorder   #History of CVA           Donald Guo MD

## 2024-11-19 NOTE — CARE PLAN
The patient's goals for the shift include      The clinical goals for the shift include Patient will remain comfortable    Over the shift, the patient did not make progress toward the following goals. Barriers to progression include acute cholecystitis. Recommendations to address these barriers include IV antibiotics and pain meds as needed.

## 2024-11-23 NOTE — DISCHARGE SUMMARY
Discharge Diagnosis  Acute cholecystitis    Issues Requiring Follow-Up  PCP follow-up    Discharge Meds     Your medication list        START taking these medications        Instructions Last Dose Given Next Dose Due   amoxicillin-pot clavulanate 875-125 mg tablet  Commonly known as: Augmentin      Take 1 tablet by mouth 2 times a day for 10 days.              CONTINUE taking these medications        Instructions Last Dose Given Next Dose Due   amLODIPine 10 mg tablet  Commonly known as: Norvasc           aspirin 81 mg chewable tablet           atorvastatin 40 mg tablet  Commonly known as: Lipitor           furosemide 20 mg tablet  Commonly known as: Lasix           levETIRAcetam 500 mg tablet  Commonly known as: Keppra           levothyroxine 25 mcg tablet  Commonly known as: Synthroid, Levoxyl           lisinopril 40 mg tablet                     Where to Get Your Medications        These medications were sent to [facility]  [address], [facility] [address], [facility] [address]      Hours: 8 AM to 6 PM Mon-Fri, 9 AM to 1 PM Saturday Phone: [phone]   amoxicillin-pot clavulanate 875-125 mg tablet         Test Results Pending At Discharge  Pending Labs       No current pending labs.            Procedures       Hospital Course   Fela was admitted to hospital with acute cholecystitis.  She was evaluated by the general surgery team.  Unfortunately patient has had this in the past and they did try to remove it laparoscopically however patient coded during the procedure and they were unable to finish the procedure.  Given this information and patient's otherwise stability who is opted to treat medically.  She was evaluated by the infectious disease team and will need to finish out a course of Augmentin.  At this time she is otherwise hemodynamically stable appropriate for discharge.  This plan was discussed with her and her family and they are all in agreement.  All questions were answered.    Blood pressure  "151/79, pulse 69, temperature 36.5 °C (97.7 °F), temperature source Temporal, resp. rate 18, height 1.473 m (4' 10\"), weight 65 kg (143 lb 4.8 oz), SpO2 95%.  Pertinent Physical Exam At Time of Discharge  Physical Exam  Vitals and nursing note reviewed.   Constitutional:       General: She is not in acute distress.     Appearance: Normal appearance.   HENT:      Head: Normocephalic and atraumatic.   Cardiovascular:      Rate and Rhythm: Normal rate and regular rhythm.      Heart sounds: Normal heart sounds.   Pulmonary:      Effort: Pulmonary effort is normal. No respiratory distress.      Breath sounds: Normal breath sounds. No wheezing.   Abdominal:      General: Abdomen is flat. Bowel sounds are normal. There is no distension.      Palpations: Abdomen is soft.      Tenderness: There is no abdominal tenderness.   Musculoskeletal:         General: No swelling or tenderness. Normal range of motion.   Skin:     General: Skin is warm and dry.      Capillary Refill: Capillary refill takes less than 2 seconds.   Neurological:      General: No focal deficit present.      Mental Status: She is alert. She is disoriented.   Psychiatric:         Mood and Affect: Mood normal.         Outpatient Follow-Up  No future appointments.      Ian Ely DO FACP     Time spent during this discharge: >30 min        "

## 2025-08-15 ENCOUNTER — OFFICE VISIT (OUTPATIENT)
Dept: OTOLARYNGOLOGY | Facility: CLINIC | Age: OVER 89
End: 2025-08-15
Payer: MEDICARE

## 2025-08-15 VITALS — WEIGHT: 143 LBS | HEIGHT: 58 IN | TEMPERATURE: 97.6 F | BODY MASS INDEX: 30.02 KG/M2

## 2025-08-15 DIAGNOSIS — H91.93 DECREASED HEARING OF BOTH EARS: ICD-10-CM

## 2025-08-15 DIAGNOSIS — H61.23 BILATERAL IMPACTED CERUMEN: Primary | ICD-10-CM

## 2025-08-15 PROCEDURE — 1159F MED LIST DOCD IN RCRD: CPT

## 2025-08-15 PROCEDURE — 99202 OFFICE O/P NEW SF 15 MIN: CPT

## 2025-08-15 PROCEDURE — 1036F TOBACCO NON-USER: CPT

## 2025-08-15 RX ORDER — CHOLECALCIFEROL (VITAMIN D3) 25 MCG
25 TABLET ORAL DAILY
COMMUNITY